# Patient Record
Sex: FEMALE | Race: BLACK OR AFRICAN AMERICAN | NOT HISPANIC OR LATINO | Employment: FULL TIME | URBAN - METROPOLITAN AREA
[De-identification: names, ages, dates, MRNs, and addresses within clinical notes are randomized per-mention and may not be internally consistent; named-entity substitution may affect disease eponyms.]

---

## 2017-01-11 ENCOUNTER — HOSPITAL ENCOUNTER (EMERGENCY)
Facility: HOSPITAL | Age: 37
Discharge: HOME | End: 2017-01-12
Attending: EMERGENCY MEDICINE
Payer: MEDICAID

## 2017-01-11 VITALS
HEART RATE: 85 BPM | TEMPERATURE: 99 F | RESPIRATION RATE: 18 BRPM | OXYGEN SATURATION: 100 % | BODY MASS INDEX: 37.35 KG/M2 | WEIGHT: 238 LBS | SYSTOLIC BLOOD PRESSURE: 161 MMHG | DIASTOLIC BLOOD PRESSURE: 95 MMHG | HEIGHT: 67 IN

## 2017-01-11 DIAGNOSIS — M54.16 LUMBAR RADICULOPATHY, ACUTE: Primary | ICD-10-CM

## 2017-01-11 PROCEDURE — 99283 EMERGENCY DEPT VISIT LOW MDM: CPT | Mod: 25

## 2017-01-11 PROCEDURE — 96372 THER/PROPH/DIAG INJ SC/IM: CPT

## 2017-01-11 PROCEDURE — 99284 EMERGENCY DEPT VISIT MOD MDM: CPT | Mod: ,,, | Performed by: EMERGENCY MEDICINE

## 2017-01-11 RX ORDER — KETOROLAC TROMETHAMINE 30 MG/ML
10 INJECTION, SOLUTION INTRAMUSCULAR; INTRAVENOUS
Status: COMPLETED | OUTPATIENT
Start: 2017-01-12 | End: 2017-01-12

## 2017-01-11 NOTE — ED AVS SNAPSHOT
[facility]  [address] [facility] [address]               Nila Rose   2017 10:56 PM   ED    Description:  Female : 1980   Department:  [facility]           Your Care was Coordinated By:     Provider Role From To    Kylee Batres MD Attending Provider [phone] --    Eden Hickey PA-C Physician Assistant 17 [phone] --      Reason for Visit     Back Pain           Diagnoses this Visit        Comments    Lumbar radiculopathy, acute    -  Primary       ED Disposition     ED Disposition Condition Comment    Discharge             To Do List           Follow-up Information     Schedule an appointment as soon as possible for a visit with [facility].    Specialty:  Orthopedics    Why:  For reevaluation    Contact information:    [address] [facility] [id number] [phone]    Additional information:    [address] - [address]th Floor       These Medications        Disp Refills Start End    diclofenac (VOLTAREN) 75 MG EC tablet 14 tablet 0 2017     Take 1 tablet (75 mg total) by mouth 2 (two) times daily. - Oral    methocarbamol (ROBAXIN) 500 MG Tab 30 tablet 0 2017    Take 2 tablets (1,000 mg total) by mouth 3 (three) times daily. - Oral      [facility]     [facility] On Call Nurse Care Line -  Assistance  Registered nurses in the [facility] provide clinical advisement, health education, appointment booking, and other advisory services.  Call for this free service at [phone].             Medications           Message regarding Medications     Verify the changes and/or additions to your medication regime listed below are the same as discussed with your clinician today.  If any of these changes or additions are incorrect, please notify your healthcare provider.        START taking these NEW medications        Refills    diclofenac (VOLTAREN) 75 MG EC  "tablet 0    Sig: Take 1 tablet (75 mg total) by mouth 2 (two) times daily.    Class: Print    Route: Oral    methocarbamol (ROBAXIN) 500 MG Tab 0    Sig: Take 2 tablets (1,000 mg total) by mouth 3 (three) times daily.    Class: Print    Route: Oral      These medications were administered today        Dose Freq    ketorolac injection 10 mg 10 mg ED 1 Time    Starting on: 1/12/2017    Sig: Inject 10 mg into the muscle ED 1 Time.    Class: Normal    Route: Intramuscular    Cosign for Ordering: Required by Kylee Batres MD           Verify that the below list of medications is an accurate representation of the medications you are currently taking.  If none reported, the list may be blank. If incorrect, please contact your healthcare provider. Carry this list with you in case of emergency.           Current Medications     butalbital-acetaminophen-caffeine -40 mg (FIORICET, ESGIC) -40 mg per tablet Take 1 tablet by mouth every 4 (four) hours as needed for Pain.    diclofenac (VOLTAREN) 75 MG EC tablet Take 1 tablet (75 mg total) by mouth 2 (two) times daily.    ketorolac injection 10 mg Inject 10 mg into the muscle ED 1 Time.    metformin (GLUMETZA) 500 MG (MOD) 24 hr tablet Take 500 mg by mouth daily with breakfast.    methocarbamol (ROBAXIN) 500 MG Tab Take 2 tablets (1,000 mg total) by mouth 3 (three) times daily.    UNKNOWN TO PATIENT Does not know name.           Clinical Reference Information           Your Vitals Were     BP Pulse Temp Resp Height Weight    161/95 85 98.7 °F (37.1 °C) (Oral) 18 5' 7" (1.702 m) 108 kg (238 lb)    Last Period SpO2 BMI          01/04/2017 100% 37.28 kg/m2        Allergies as of 1/12/2017     No Known Allergies      Immunizations Administered on Date of Encounter - 1/12/2017     None      ED Micro, Lab, POCT     Start Ordered       Status Ordering Provider    01/12/17 0000 01/12/17 0000  ISTAT PROCEDURE  Once      Final result     01/11/17 2349 01/11/17 2349  ISTAT " CHEM8  Once      Acknowledged       ED Imaging Orders     None      Discharge References/Attachments     BACK PAIN (LOW) OR LEG PAIN: POSSIBLE CAUSES (ENGLISH)    SCIATICA (ENGLISH)       [facility] complies with applicable Federal civil rights laws and does not discriminate on the basis of race, color, national origin, age, disability, or sex.        Language Assistance Services     ATTENTION: Language assistance services are available, free of charge. Please call [phone].      ATENCIÓN: Si habla dirk, tiene a ojeda disposición servicios gratuitos de asistencia lingüística. Llame al [phone].     CHÚ Ý: N?u b?n nói Ti?ng Vi?t, có các d?ch v? h? tr? ngôn ng? mi?n phí dành cho b?n. G?i s? [phone].

## 2017-01-12 LAB
BUN SERPL-MCNC: 9 MG/DL (ref 6–30)
CHLORIDE SERPL-SCNC: 102 MMOL/L (ref 95–110)
CREAT SERPL-MCNC: 0.6 MG/DL (ref 0.5–1.4)
GLUCOSE SERPL-MCNC: 118 MG/DL (ref 70–110)
HCT VFR BLD CALC: 36 %PCV (ref 36–54)
POC IONIZED CALCIUM: 1.13 MMOL/L (ref 1.06–1.42)
POC TCO2 (MEASURED): 26 MMOL/L (ref 23–29)
POTASSIUM BLD-SCNC: 3.7 MMOL/L (ref 3.5–5.1)
SAMPLE: ABNORMAL
SODIUM BLD-SCNC: 140 MMOL/L (ref 136–145)

## 2017-01-12 PROCEDURE — 63600175 PHARM REV CODE 636 W HCPCS: Performed by: PHYSICIAN ASSISTANT

## 2017-01-12 RX ORDER — DICLOFENAC SODIUM 75 MG/1
75 TABLET, DELAYED RELEASE ORAL 2 TIMES DAILY
Qty: 14 TABLET | Refills: 0 | Status: SHIPPED | OUTPATIENT
Start: 2017-01-12 | End: 2017-02-28

## 2017-01-12 RX ORDER — METHOCARBAMOL 500 MG/1
1000 TABLET, FILM COATED ORAL 3 TIMES DAILY
Qty: 30 TABLET | Refills: 0 | Status: SHIPPED | OUTPATIENT
Start: 2017-01-12 | End: 2017-01-17

## 2017-01-12 RX ADMIN — KETOROLAC TROMETHAMINE 10 MG: 30 INJECTION, SOLUTION INTRAMUSCULAR at 12:01

## 2017-01-12 NOTE — ED PROVIDER NOTES
Encounter Date: 1/11/2017       History     Chief Complaint   Patient presents with    Back Pain     Lower back pain which goes down the left leg.      Review of patient's allergies indicates:  No Known Allergies  HPI Comments: 36-year-old female with a PMH of migraine headache, DM presents to the ED with a chief complaint of back pain.  Patient reports low left-sided back pain over the past month that worsened yesterday.  Pain radiates down the left leg and is worse with standing, moving, and sitting for long periods of time.  She reports some associated tingling to the left foot. Patient reports working out the day before his symptoms began.  Patient had a previous prescription of meloxicam which she has taken for her pain without much relief.  Patient denies fever, chills, extremity weakness, saddle anesthesia, bowel/bladder dysfunction.     The history is provided by the patient.     Past Medical History   Diagnosis Date    Diabetes mellitus     Hypertension     Migraine headache      No past medical history pertinent negatives.  Past Surgical History   Procedure Laterality Date    Tubal ligation       Family History   Problem Relation Age of Onset    Hypertension Mother     Hypertension Father     Diabetes Father      Social History   Substance Use Topics    Smoking status: Never Smoker    Smokeless tobacco: None    Alcohol use No     Review of Systems   Constitutional: Negative for chills and fever.   HENT: Negative for sore throat.    Respiratory: Negative for shortness of breath.    Cardiovascular: Negative for chest pain.   Gastrointestinal: Negative for nausea and vomiting.        (+) Bowel dysfunction   Genitourinary:        (-) bladder dysfunction, saddle anesthesia   Musculoskeletal: Positive for back pain.   Neurological: Positive for numbness. Negative for syncope and weakness.       Physical Exam   Initial Vitals   BP Pulse Resp Temp SpO2   01/11/17 2013 01/11/17 2013 01/11/17 2013  01/11/17 2013 01/11/17 2013   161/95 85 18 98.7 °F (37.1 °C) 100 %     Physical Exam    Constitutional: She appears well-developed and well-nourished. She is not diaphoretic.  Non-toxic appearance. She does not appear ill. No distress.   HENT:   Head: Normocephalic and atraumatic.   Right Ear: External ear normal.   Left Ear: External ear normal.   Eyes: EOM are normal.   Neck: Normal range of motion and phonation normal. Neck supple.   Cardiovascular: Normal rate and regular rhythm. Exam reveals no gallop and no friction rub.    No murmur heard.  Pulmonary/Chest: Effort normal and breath sounds normal. No accessory muscle usage. No tachypnea. No respiratory distress. She has no decreased breath sounds. She has no wheezes. She has no rhonchi. She has no rales.   Abdominal: Soft. Normal appearance. She exhibits no distension.   Musculoskeletal:   Left-sided lumbosacral tenderness.  Tenderness to the left buttock and along the distribution of the sciatic nerve.  Positive straight leg raise on the left.  No rashes or skin lesions noted.   Neurological: She is alert and oriented to person, place, and time.   Skin: Skin is warm and dry. No rash noted. No pallor.   Psychiatric: She has a normal mood and affect. Her behavior is normal. Judgment and thought content normal.         ED Course   Procedures  Labs Reviewed   ISTAT PROCEDURE - Abnormal; Notable for the following:        Result Value    POC Glucose 118 (*)     All other components within normal limits   ISTAT CHEM8             Medical Decision Making:   History:   Old Medical Records: I decided to obtain old medical records.  Differential Diagnosis:   Muscle strain, lumbar radiculopathy, herniated disc, cauda equina syndrome       APC / Resident Notes:   MDM:  36-year-old female presents for evaluation of left-sided low back pain that radiates down the left lower extremity.  Pain has been present for the past month, but worsened one to 2 days ago.  She is  hypertensive at 161/95.  Vitals otherwise within normal limits.  Physical exam reveals Left-sided lumbosacral tenderness.  Tenderness to the left buttock and along the distribution of the sciatic nerve.  Positive straight leg raise on the left.  No rashes or skin lesions noted.    Patient patient's history and exam, her pain is likely secondary to lumbar radiculopathy.  I will treat with Toradol in the ED and discharge with NSAIDs.  Patient will follow up with the spine center next week if symptoms do not improve.  ED warnings and return precautions given.  I have reviewed the patient's records and discussed this case with my supervising physician.         Attending Attestation:     Physician Attestation Statement for NP/PA:   I discussed this assessment and plan of this patient with the NP/PA, but I did not personally examine the patient. The face to face encounter was performed by the NP/PA.          Attending ED Notes:   56-year-old with atraumatic back pain.  Symptoms consistent with sciatica.  No neurologic deficit.          ED Course     Clinical Impression:   The encounter diagnosis was Lumbar radiculopathy, acute.    Disposition:   Disposition: Discharged  Condition: Stable       DOROTHY MurrayC  01/12/17 0054       Kylee Batres MD  01/12/17 0452

## 2017-01-12 NOTE — ED NOTES
Pt assisted to bed. No acute distress is noted. Pt identifiers verified  Appearance: Pt is clean and well groomed. Clothes appropriately fastened  Neuro: pt awake and alert. Pt oriented x4. Speech is clear and appropriate. Facial movement is symmetrical. Pt moving all extremities. Sensation in all extremities is intact, numbness to left lower foot, shooting pain down left lower extremity, pain to left lower back.   Resp: airway is patent with normal rate and effort noted. Pt on room air.   Skin: skin is warm, dry and intact  Cardiac: Heart sounds are normal. Peripheral pulses are palpable and intact. Cap refill <3 seconds. No edema noted  GI: abdomen is soft and non-tender. Pt denies abdominal pain. Pt states bowel movements have been regular  : pt denies frequency or pain when urinating

## 2017-02-28 ENCOUNTER — HOSPITAL ENCOUNTER (EMERGENCY)
Facility: OTHER | Age: 37
Discharge: HOME | End: 2017-02-28
Attending: EMERGENCY MEDICINE
Payer: MEDICAID

## 2017-02-28 VITALS
DIASTOLIC BLOOD PRESSURE: 95 MMHG | TEMPERATURE: 99 F | RESPIRATION RATE: 16 BRPM | OXYGEN SATURATION: 98 % | WEIGHT: 234 LBS | SYSTOLIC BLOOD PRESSURE: 139 MMHG | BODY MASS INDEX: 36.73 KG/M2 | HEART RATE: 91 BPM | HEIGHT: 67 IN

## 2017-02-28 DIAGNOSIS — M54.42 LEFT-SIDED LOW BACK PAIN WITH LEFT-SIDED SCIATICA, UNSPECIFIED CHRONICITY: Primary | ICD-10-CM

## 2017-02-28 LAB
B-HCG UR QL: NEGATIVE
CTP QC/QA: YES

## 2017-02-28 PROCEDURE — 63600175 PHARM REV CODE 636 W HCPCS: Performed by: PHYSICIAN ASSISTANT

## 2017-02-28 PROCEDURE — 96372 THER/PROPH/DIAG INJ SC/IM: CPT

## 2017-02-28 PROCEDURE — 25000003 PHARM REV CODE 250: Performed by: PHYSICIAN ASSISTANT

## 2017-02-28 PROCEDURE — 99283 EMERGENCY DEPT VISIT LOW MDM: CPT | Mod: 25

## 2017-02-28 PROCEDURE — 81025 URINE PREGNANCY TEST: CPT | Performed by: EMERGENCY MEDICINE

## 2017-02-28 RX ORDER — IBUPROFEN 800 MG/1
800 TABLET ORAL EVERY 6 HOURS PRN
Qty: 20 TABLET | Refills: 0 | Status: SHIPPED | OUTPATIENT
Start: 2017-02-28 | End: 2017-11-29

## 2017-02-28 RX ORDER — METHOCARBAMOL 500 MG/1
500 TABLET, FILM COATED ORAL
Status: COMPLETED | OUTPATIENT
Start: 2017-02-28 | End: 2017-02-28

## 2017-02-28 RX ORDER — METHOCARBAMOL 500 MG/1
1000 TABLET, FILM COATED ORAL 3 TIMES DAILY
Qty: 30 TABLET | Refills: 0 | Status: SHIPPED | OUTPATIENT
Start: 2017-02-28 | End: 2017-03-05

## 2017-02-28 RX ORDER — KETOROLAC TROMETHAMINE 30 MG/ML
30 INJECTION, SOLUTION INTRAMUSCULAR; INTRAVENOUS
Status: COMPLETED | OUTPATIENT
Start: 2017-02-28 | End: 2017-02-28

## 2017-02-28 RX ADMIN — METHOCARBAMOL 500 MG: 500 TABLET ORAL at 06:02

## 2017-02-28 RX ADMIN — KETOROLAC TROMETHAMINE 30 MG: 30 INJECTION, SOLUTION INTRAMUSCULAR at 06:02

## 2017-02-28 NOTE — ED NOTES
Two patient identifiers correct for Nila Rose.   LOC: The patient is awake, alert and aware of environment with an appropriate affect. The patient is oriented x 3 and speaking appropriate.  APPEARANCE: Patient resting comfortably and in no acute distress.  Patient is clean and well groomed.  The patient's clothing is properly fastened.  SKIN: The skin is warm and dry.  The patient has normal skin turgor and moist mucus membranes.  No rashes or lesions noted.  Skin Intact. No breakdown noted.  MUSCULOSKELETAL:  With left lower back pain radiating to the left hip and down the left leg to the foot.  Patient moving all extremities well, no obvious swelling or deformities noted.  RESPIRATORY: Airway is open and patent.  Respirations are spontaneous.  Patient has a normal effort and rate.  Trachea midline.  No accessory muscle use.  CARDIAC: Patient has a normal rate and rhythm.  No peripheral edema noted.  Capillary refill is less than 3 seconds.    ABDOMEN: Soft and non tender to palpation.  No distention noted.   PULSES:   Radial pulses, 2+ bilaterally.    NEUROLOGIC: PERRL . Facial expression is symmetrical.  Moving all extremities equally.  Normal sensation in all extremities when touched with a finger.  Awake, alert and cooperative with a calm affect.  Aware of environment.

## 2017-02-28 NOTE — ED NOTES
Ambulated to ED room 13.  Presents to the ED with complaint of left lower back pain radiating to the left hip and down the left leg to the left foot.  Reports tingling to the left calf and left foot.  History of sciatica.  History of Diabetes.  Denies any falls trauma or injury.

## 2017-02-28 NOTE — ED AVS SNAPSHOT
[facility]  [address]  [facility] [address]               Nila Rose   2017  5:51 PM   ED    Description:  Female : 1980   Department:  [facility]           Your Care was Coordinated By:     Provider Role From To    Catrina Brown MD Attending Provider 17 [phone] --    Arlyn Beckwith PA-C Physician Assistant 17 [phone] --      Reason for Visit     Leg Pain           Diagnoses this Visit        Comments    Left-sided low back pain with left-sided sciatica, unspecified chronicity    -  Primary       ED Disposition     None           To Do List           Follow-up Information     Follow up with [facility] In 2 days.    Specialties:  Nursing Home Agency, SNF Agency    Contact information:    [address] [facility]  [facility] [id number] [phone]          Follow up with [facility].    Specialty:  Emergency Medicine    Why:  If symptoms worsen    Contact information:    [address]  [facility] [address] [phone]        Follow up with [facility] [place].    Specialty:  Spine Services    Contact information:    [address]  [address]  [facility] [address] [phone]    Additional information:    [facility], 4th [address], [address]       These Medications        Disp Refills Start End    methocarbamol (ROBAXIN) 500 MG Tab 30 tablet 0 2017 3/5/2017    Take 2 tablets (1,000 mg total) by mouth 3 (three) times daily. - Oral    ibuprofen (ADVIL,MOTRIN) 800 MG tablet 20 tablet 0 2017     Take 1 tablet (800 mg total) by mouth every 6 (six) hours as needed for Pain. - Oral      [facility]     [facility] Care [facility] -  Assistance  Registered nurses in the [facility] provide clinical advisement, health education, appointment booking, and other advisory services.  Call for this free service at  [phone].             Medications           Message regarding Medications     Verify the changes and/or additions to your medication regime listed below are the same as discussed with your clinician today.  If any of these changes or additions are incorrect, please notify your healthcare provider.        START taking these NEW medications        Refills    methocarbamol (ROBAXIN) 500 MG Tab 0    Sig: Take 2 tablets (1,000 mg total) by mouth 3 (three) times daily.    Class: Print    Route: Oral    ibuprofen (ADVIL,MOTRIN) 800 MG tablet 0    Sig: Take 1 tablet (800 mg total) by mouth every 6 (six) hours as needed for Pain.    Class: Print    Route: Oral      These medications were administered today        Dose Freq    ketorolac injection 30 mg 30 mg ED 1 Time    Sig: Inject 30 mg into the muscle ED 1 Time.    Class: Normal    Route: Intramuscular    methocarbamol tablet 500 mg 500 mg ED 1 Time    Sig: Take 1 tablet (500 mg total) by mouth ED 1 Time.    Class: Normal    Route: Oral      STOP taking these medications     diclofenac (VOLTAREN) 75 MG EC tablet Take 1 tablet (75 mg total) by mouth 2 (two) times daily.    UNKNOWN TO PATIENT Does not know name.    butalbital-acetaminophen-caffeine -40 mg (FIORICET, ESGIC) -40 mg per tablet Take 1 tablet by mouth every 4 (four) hours as needed for Pain.           Verify that the below list of medications is an accurate representation of the medications you are currently taking.  If none reported, the list may be blank. If incorrect, please contact your healthcare provider. Carry this list with you in case of emergency.           Current Medications     ibuprofen (ADVIL,MOTRIN) 800 MG tablet Take 1 tablet (800 mg total) by mouth every 6 (six) hours as needed for Pain.    metformin (GLUMETZA) 500 MG (MOD) 24 hr tablet Take 500 mg by mouth daily with breakfast.    methocarbamol (ROBAXIN) 500 MG Tab Take 2 tablets (1,000 mg total) by mouth 3 (three) times  daily.           Clinical Reference Information           Your Vitals Were     BP                   139/95 (BP Location: Right arm, Patient Position: Sitting, BP Method: Automatic)           Allergies as of 2/28/2017     No Known Allergies      Immunizations Administered on Date of Encounter - 2/28/2017     None      ED Micro, Lab, POCT     Start Ordered       Status Ordering Provider    02/28/17 1723 02/28/17 1722  POCT urine pregnancy  Once      Final result       ED Imaging Orders     None      Discharge References/Attachments     BACK PAIN (ACUTE OR CHRONIC) (ENGLISH)    BACK PAIN (LOW): SELF-CARE (ENGLISH)    SCIATICA (ENGLISH)       [facility] complies with applicable Federal civil rights laws and does not discriminate on the basis of race, color, national origin, age, disability, or sex.        Language Assistance Services     ATTENTION: Language assistance services are available, free of charge. Please call [phone].      ATENCIÓN: Si habla dirk, tiene a ojeda disposición servicios gratuitos de asistencia lingüística. Llame al [phone].     CHÚ Ý: N?u b?n nói Ti?ng Vi?t, có các d?ch v? h? tr? ngôn ng? mi?n phí dành cho b?n. G?i s? [phone].

## 2017-03-01 NOTE — ED PROVIDER NOTES
Encounter Date: 2/28/2017       History     Chief Complaint   Patient presents with    Leg Pain     + left leg pain, + left sided lower back pain radiating to left leg, hx of sciatica.     Review of patient's allergies indicates:  No Known Allergies  HPI Comments: Patient is a 37 y.o. female presenting to the emergency department with complaints of left low back pain that radiates into her left leg.  The patient reports her current episode of symptoms started yesterday evening.  She does report some daily pain, but states that she has had a recent exacerbation.  She denies recent injury or trauma.  She denies numbness, tingling, weakness of her lower extremities bilaterally.  She denies loss of urinary bowel control.  She does report that she has had similar symptoms in the past, states she is typically treated with NSAIDs.  She states that she is in the process of establishing a primary care provider to follow up with.  She denies dysuria or hematuria.    The history is provided by the patient.     Past Medical History:   Diagnosis Date    Diabetes mellitus     Hypertension     Migraine headache      Past Surgical History:   Procedure Laterality Date    TUBAL LIGATION       Family History   Problem Relation Age of Onset    Hypertension Mother     Hypertension Father     Diabetes Father      Social History   Substance Use Topics    Smoking status: Never Smoker    Smokeless tobacco: None    Alcohol use No     Review of Systems   Constitutional: Negative for activity change, appetite change, chills, fatigue and fever.   HENT: Negative for congestion, rhinorrhea, sinus pressure, sneezing, sore throat and trouble swallowing.    Eyes: Negative for photophobia and visual disturbance.   Respiratory: Negative for cough, chest tightness, shortness of breath and wheezing.    Cardiovascular: Negative for chest pain and palpitations.   Gastrointestinal: Negative for abdominal pain, constipation, diarrhea, nausea and  vomiting.   Genitourinary: Negative for dysuria, hematuria and urgency.   Musculoskeletal: Positive for back pain and myalgias. Negative for neck pain and neck stiffness.   Skin: Negative for color change and wound.   Neurological: Negative for dizziness, weakness, light-headedness, numbness and headaches.   Psychiatric/Behavioral: Negative for agitation and confusion.       Physical Exam   Initial Vitals   BP Pulse Resp Temp SpO2   02/28/17 1721 02/28/17 1721 02/28/17 1721 02/28/17 1721 02/28/17 1721   169/110 103 16 98 °F (36.7 °C) 97 %     Physical Exam    Nursing note and vitals reviewed.  Constitutional: She appears well-developed and well-nourished. She is not diaphoretic. She is cooperative.  Non-toxic appearance. She does not have a sickly appearance. She does not appear ill. No distress.   Well appearing, -American female unaccompanied in the emergency department.  She is speaking in clear and full sentences, moving all extremities, ambulates without difficulty.  She is in no acute distress.   HENT:   Head: Normocephalic and atraumatic.   Right Ear: External ear normal.   Left Ear: External ear normal.   Nose: Nose normal.   Mouth/Throat: Oropharynx is clear and moist.   Eyes: Conjunctivae and EOM are normal.   Neck: Normal range of motion. Neck supple.   Cardiovascular: Normal rate, regular rhythm and normal heart sounds.   Pulmonary/Chest: Breath sounds normal. No respiratory distress. She has no wheezes. She has no rhonchi. She has no rales.   Abdominal: Soft. Bowel sounds are normal. She exhibits no distension. There is no tenderness. There is no rebound and no guarding.   Musculoskeletal: Normal range of motion.   Tenderness to palpation the left sided paraspinal muscles of the lumbar spine at the SI joint with no midline tenderness, palpable deformity, crepitus or step-off.  Ambulatory without difficulty.  Positive left sided straight leg test.   Neurological: She is alert and oriented to  person, place, and time. She has normal strength. No cranial nerve deficit or sensory deficit. GCS eye subscore is 4. GCS verbal subscore is 5. GCS motor subscore is 6.   Skin: Skin is warm.   Psychiatric: She has a normal mood and affect. Her behavior is normal. Judgment and thought content normal.         ED Course   Procedures  Labs Reviewed   POCT URINE PREGNANCY             Medical Decision Making:   Clinical Tests:   Lab Tests: Ordered and Reviewed  The following lab test(s) were unremarkable: UPT  Other:   I have discussed this case with another health care provider.       <> Summary of the Discussion: Dr. Brown  This note was created using Dragon Medical Dictation. There may be typographical errors secondary to dictation.     Urgent evaluation of a 37 y.o. female presenting to the emergency department complaining of left low back pain with radiation to the left leg. Patient is afebrile, nontoxic appearing, hemodynamically stable and neurovascularly intact. Physical exam reveals regular rate and rhythm, lungs are clear to auscultation bilaterally.  Tenderness to palpation the left sided paraspinal muscles of the lumbar spine and SI joint with no midline tenderness, crepitus, step-off or deformity.  Positive left sided straight leg test. There are no signs of saddle anesthesia, incontinence, neurologic deficits, fevers, trauma or midline tenderness on history or physical to suggest cauda equina, infectious process, fracture or subluxation. I do believe the patient's back pain musculoskeletal in nature.  The patient will be administered Toradol and Robaxin the emergency department, discharged home with a prescription for ibuprofen and Robaxin.  She was counseled extensively on symptomatic care and treatment. The patient was instructed to follow up with a primary care provider in 2 days or to return to the emergency department for worsening symptoms. The treatment plan was discussed with the patient who  demonstrated understanding and comfort with plan. The patient's history, physical exam, and plan of care was discussed with and agreed upon with my supervising physician.     Past Medical History:   Diagnosis Date    Diabetes mellitus     Hypertension     Migraine headache                      ED Course     Clinical Impression:     1. Left-sided low back pain with left-sided sciatica, unspecified chronicity       Disposition:   Disposition: Discharged  Condition: Stable       Arlyn Beckwith PA-C  02/28/17 1923

## 2017-09-07 ENCOUNTER — HOSPITAL ENCOUNTER (EMERGENCY)
Facility: OTHER | Age: 37
Discharge: HOME | End: 2017-09-07
Attending: EMERGENCY MEDICINE
Payer: MEDICAID

## 2017-09-07 VITALS
WEIGHT: 213 LBS | HEART RATE: 85 BPM | DIASTOLIC BLOOD PRESSURE: 87 MMHG | SYSTOLIC BLOOD PRESSURE: 143 MMHG | BODY MASS INDEX: 31.55 KG/M2 | OXYGEN SATURATION: 100 % | HEIGHT: 69 IN | RESPIRATION RATE: 16 BRPM | TEMPERATURE: 98 F

## 2017-09-07 DIAGNOSIS — R10.9 ABDOMINAL CRAMPING: Primary | ICD-10-CM

## 2017-09-07 LAB
B-HCG UR QL: NEGATIVE
BILIRUB UR QL STRIP: NEGATIVE
CLARITY UR: CLEAR
COLOR UR: YELLOW
CTP QC/QA: YES
GLUCOSE UR QL STRIP: NEGATIVE
HGB UR QL STRIP: NEGATIVE
KETONES UR QL STRIP: NEGATIVE
LEUKOCYTE ESTERASE UR QL STRIP: NEGATIVE
NITRITE UR QL STRIP: NEGATIVE
PH UR STRIP: 7 [PH] (ref 5–8)
PROT UR QL STRIP: NEGATIVE
SP GR UR STRIP: 1.01 (ref 1–1.03)
URN SPEC COLLECT METH UR: NORMAL
UROBILINOGEN UR STRIP-ACNC: NEGATIVE EU/DL

## 2017-09-07 PROCEDURE — 99283 EMERGENCY DEPT VISIT LOW MDM: CPT | Mod: 25

## 2017-09-07 PROCEDURE — 81025 URINE PREGNANCY TEST: CPT | Performed by: PHYSICIAN ASSISTANT

## 2017-09-07 PROCEDURE — 81003 URINALYSIS AUTO W/O SCOPE: CPT

## 2017-09-07 RX ORDER — PHENTERMINE HYDROCHLORIDE 37.5 MG/1
37.5 TABLET ORAL
COMMUNITY
End: 2017-11-29

## 2017-09-07 RX ORDER — TOPIRAMATE 25 MG/1
25 TABLET ORAL 2 TIMES DAILY
COMMUNITY

## 2017-09-07 NOTE — ED PROVIDER NOTES
"Encounter Date: 9/7/2017       History     Chief Complaint   Patient presents with    Abdominal Pain     + right sided low abdominal pains x several days " I thought I was pregnant but my tubes are tied. I had my period last Monday but it only lasted three days" + intermittent nausea w/out vomiting.      Pt is a 37-year-old female with history of diabetes and hypertension who presents to the emergency department with intermittent pelvic cramping.  Patient states over the last week she has had intermittent sharp pains in the right side of her pelvic region.  She states that her period has been abnormal the last 2 months.  She states that she is not currently in any pain.  She denies urinary symptoms.  She denies active vaginal bleeding currently.  She denies any abnormal vaginal discharge.  She denies nausea or vomiting.  She denies diarrhea.  She reports a normal bowel movement yesterday with no blood per rectum.  She denies fevers or chills.  She states that with her periods being abnormal, her friend told her she may be pregnant.  She states her tubes are tied, so she wants to make sure that this is not an ectopic pregnancy.      The history is provided by the patient.     Review of patient's allergies indicates:  No Known Allergies  Past Medical History:   Diagnosis Date    Diabetes mellitus     Hypertension     Migraine headache      Past Surgical History:   Procedure Laterality Date    TUBAL LIGATION       Family History   Problem Relation Age of Onset    Hypertension Mother     Hypertension Father     Diabetes Father      Social History   Substance Use Topics    Smoking status: Never Smoker    Smokeless tobacco: Never Used    Alcohol use No     Review of Systems   Constitutional: Negative for activity change, appetite change, chills, fatigue and fever.   HENT: Negative for congestion, ear pain, sore throat and trouble swallowing.    Respiratory: Negative for cough and shortness of breath.  "   Cardiovascular: Negative for chest pain.   Gastrointestinal: Negative for abdominal pain, blood in stool, constipation, diarrhea, nausea and vomiting.   Genitourinary: Positive for pelvic pain. Negative for dysuria, flank pain, vaginal bleeding, vaginal discharge and vaginal pain.   Musculoskeletal: Negative for back pain, neck pain and neck stiffness.   Skin: Negative for rash and wound.   Neurological: Negative for dizziness, syncope, speech difficulty, weakness, light-headedness, numbness and headaches.       Physical Exam     Initial Vitals [09/07/17 1338]   BP Pulse Resp Temp SpO2   (!) 185/111 90 16 98.8 °F (37.1 °C) 100 %      MAP       135.67         Physical Exam    Nursing note and vitals reviewed.  Constitutional: She appears well-developed and well-nourished. She is not diaphoretic.  Non-toxic appearance. No distress.   HENT:   Head: Normocephalic.   Right Ear: Hearing and external ear normal.   Left Ear: Hearing and external ear normal.   Nose: Nose normal.   Mouth/Throat: Uvula is midline, oropharynx is clear and moist and mucous membranes are normal. No trismus in the jaw. No uvula swelling. No oropharyngeal exudate.   Eyes: Conjunctivae are normal. Pupils are equal, round, and reactive to light.   Neck: Normal range of motion.   Cardiovascular: Normal rate and regular rhythm.   Pulmonary/Chest: Breath sounds normal. No respiratory distress. She has no wheezes. She has no rhonchi. She has no rales. She exhibits no tenderness.   Abdominal: Soft. Bowel sounds are normal. She exhibits no distension and no mass. There is no tenderness. There is no rebound and no guarding.   Lymphadenopathy:     She has no cervical adenopathy.   Neurological: She is alert and oriented to person, place, and time.   Skin: Skin is warm and dry. Capillary refill takes less than 2 seconds.   Psychiatric: She has a normal mood and affect.         ED Course   Procedures  Labs Reviewed   URINALYSIS   POCT URINE PREGNANCY              Medical Decision Making:   Initial Assessment:   Urgent evaluation of 37-year-old female with history of hypertension and diabetes who presents to the emergency department with intermittent pelvic cramping.  Patient is afebrile, nontoxic appearing, and hemodynamically stable.  Benign abdominal exam.  I do not suspect acute abdomen.  I do not suspect TOA, ovarian torsion, or PID.  UPT and urinalysis will be obtained.  ED Management:  UPT is negative.  Urinalysis reveals no signs of infection.  Patient is currently still asymptomatic.  I do not feel that further workup is warranted here.  She is advised to follow-up with PCP or return to the emergency department with any worsening symptoms or concerns.  Other:   I have discussed this case with another health care provider.       <> Summary of the Discussion: Dr. Encinas                   ED Course      Clinical Impression:   The encounter diagnosis was Abdominal cramping.                           Yarely Cunningham PA-C  09/07/17 2026

## 2017-09-07 NOTE — ED NOTES
Pt reports RLQ abd pain radiating to low back since Monday. Intermittent, sharp pain. Subjective fever yesterday. Nausea. Denies v/d. NAD. Denies urinary problems. Will cont to monitor.

## 2018-04-21 ENCOUNTER — HOSPITAL ENCOUNTER (EMERGENCY)
Facility: HOSPITAL | Age: 38
Discharge: HOME | End: 2018-04-21
Attending: EMERGENCY MEDICINE
Payer: MEDICAID

## 2018-04-21 VITALS
HEIGHT: 67 IN | OXYGEN SATURATION: 100 % | TEMPERATURE: 98 F | DIASTOLIC BLOOD PRESSURE: 87 MMHG | RESPIRATION RATE: 18 BRPM | HEART RATE: 83 BPM | BODY MASS INDEX: 33.74 KG/M2 | WEIGHT: 215 LBS | SYSTOLIC BLOOD PRESSURE: 165 MMHG

## 2018-04-21 DIAGNOSIS — R51.9 NONINTRACTABLE HEADACHE, UNSPECIFIED CHRONICITY PATTERN, UNSPECIFIED HEADACHE TYPE: Primary | ICD-10-CM

## 2018-04-21 LAB
B-HCG UR QL: NEGATIVE
BILIRUB UR QL STRIP: NEGATIVE
CLARITY UR: CLEAR
COLOR UR: NORMAL
CTP QC/QA: YES
GLUCOSE UR QL STRIP: NEGATIVE
HGB UR QL STRIP: NEGATIVE
KETONES UR QL STRIP: NEGATIVE
LEUKOCYTE ESTERASE UR QL STRIP: NEGATIVE
NITRITE UR QL STRIP: NEGATIVE
PH UR STRIP: 6 [PH] (ref 5–8)
POCT GLUCOSE: 148 MG/DL (ref 70–110)
PROT UR QL STRIP: NEGATIVE
SP GR UR STRIP: 1.01 (ref 1–1.03)
URN SPEC COLLECT METH UR: NORMAL
UROBILINOGEN UR STRIP-ACNC: NEGATIVE EU/DL

## 2018-04-21 PROCEDURE — 81003 URINALYSIS AUTO W/O SCOPE: CPT

## 2018-04-21 PROCEDURE — 81025 URINE PREGNANCY TEST: CPT | Performed by: PHYSICIAN ASSISTANT

## 2018-04-21 PROCEDURE — 96372 THER/PROPH/DIAG INJ SC/IM: CPT | Mod: 59

## 2018-04-21 PROCEDURE — 96374 THER/PROPH/DIAG INJ IV PUSH: CPT

## 2018-04-21 PROCEDURE — 63600175 PHARM REV CODE 636 W HCPCS: Performed by: PHYSICIAN ASSISTANT

## 2018-04-21 PROCEDURE — 82962 GLUCOSE BLOOD TEST: CPT

## 2018-04-21 PROCEDURE — 99284 EMERGENCY DEPT VISIT MOD MDM: CPT | Mod: 25

## 2018-04-21 PROCEDURE — 96375 TX/PRO/DX INJ NEW DRUG ADDON: CPT

## 2018-04-21 RX ORDER — ETODOLAC 200 MG/1
200 CAPSULE ORAL 3 TIMES DAILY PRN
Qty: 20 CAPSULE | Refills: 0 | Status: SHIPPED | OUTPATIENT
Start: 2018-04-21 | End: 2018-04-28

## 2018-04-21 RX ORDER — ORPHENADRINE CITRATE 30 MG/ML
30 INJECTION INTRAMUSCULAR; INTRAVENOUS
Status: COMPLETED | OUTPATIENT
Start: 2018-04-21 | End: 2018-04-21

## 2018-04-21 RX ORDER — DIPHENHYDRAMINE HYDROCHLORIDE 50 MG/ML
25 INJECTION INTRAMUSCULAR; INTRAVENOUS
Status: COMPLETED | OUTPATIENT
Start: 2018-04-21 | End: 2018-04-21

## 2018-04-21 RX ORDER — PROCHLORPERAZINE EDISYLATE 5 MG/ML
10 INJECTION INTRAMUSCULAR; INTRAVENOUS
Status: COMPLETED | OUTPATIENT
Start: 2018-04-21 | End: 2018-04-21

## 2018-04-21 RX ORDER — METHOCARBAMOL 500 MG/1
1000 TABLET, FILM COATED ORAL 3 TIMES DAILY PRN
Qty: 30 TABLET | Refills: 0 | Status: SHIPPED | OUTPATIENT
Start: 2018-04-21 | End: 2018-04-26

## 2018-04-21 RX ORDER — KETOROLAC TROMETHAMINE 30 MG/ML
15 INJECTION, SOLUTION INTRAMUSCULAR; INTRAVENOUS
Status: COMPLETED | OUTPATIENT
Start: 2018-04-21 | End: 2018-04-21

## 2018-04-21 RX ORDER — ONDANSETRON 4 MG/1
4 TABLET, ORALLY DISINTEGRATING ORAL EVERY 6 HOURS PRN
Qty: 15 TABLET | Refills: 0 | Status: SHIPPED | OUTPATIENT
Start: 2018-04-21 | End: 2018-04-26

## 2018-04-21 RX ADMIN — PROCHLORPERAZINE EDISYLATE 10 MG: 5 INJECTION INTRAMUSCULAR; INTRAVENOUS at 08:04

## 2018-04-21 RX ADMIN — ORPHENADRINE CITRATE 30 MG: 30 INJECTION INTRAMUSCULAR; INTRAVENOUS at 08:04

## 2018-04-21 RX ADMIN — DIPHENHYDRAMINE HYDROCHLORIDE 25 MG: 50 INJECTION, SOLUTION INTRAMUSCULAR; INTRAVENOUS at 08:04

## 2018-04-21 RX ADMIN — KETOROLAC TROMETHAMINE 15 MG: 30 INJECTION, SOLUTION INTRAMUSCULAR at 08:04

## 2018-04-21 NOTE — ED PROVIDER NOTES
Encounter Date: 4/21/2018    SCRIBE #1 NOTE: I, Thanh Stockton, am scribing for, and in the presence of,  Nila Anderson PA-C. I have scribed the following portions of the note - Other sections scribed: HPI.       History     Chief Complaint   Patient presents with    Headache     since Monday, hx of migraines, light and sound sensitivity    Dental Pain     on antibiotics     CC: Headache; Dental Pain;    HPI: This 38 y.o. Female with diabetes mellitus, HTN and migraine headache presents to the ED c/o headache, dizziness, nausea, frequency and neck pain x6 days. Pt reports her current headache is worse than her normal migraines. Her headache began R occipital but is now generalized. Pt states her headache normally begins around her cycle and already had her headache for April. Her dizziness feels like the room is spinning. She complies with her at home medications and excedrin and ibuprofen PRN. Pt reports her meds are making her sick. She denies fever, chills, emesis, chest pain or SOB. Pt last visited her PCP yesterday.    Pt is on antibiotics for dental pain which she began x3 days ago.      The history is provided by the patient. No  was used.     Review of patient's allergies indicates:  No Known Allergies  Past Medical History:   Diagnosis Date    Diabetes mellitus     Hypertension     Migraine headache      Past Surgical History:   Procedure Laterality Date    TUBAL LIGATION       Family History   Problem Relation Age of Onset    Hypertension Mother     Hypertension Father     Diabetes Father      Social History   Substance Use Topics    Smoking status: Never Smoker    Smokeless tobacco: Never Used    Alcohol use Yes      Comment: occ     Review of Systems   Constitutional: Negative for chills and fever.   HENT: Positive for ear pain. Negative for congestion, rhinorrhea, sore throat and trouble swallowing.         (+) R sided dental      Eyes: Negative for redness.    Respiratory: Negative for shortness of breath.    Cardiovascular: Negative for chest pain.   Gastrointestinal: Positive for nausea. Negative for abdominal pain and vomiting.   Genitourinary: Positive for frequency. Negative for dysuria, hematuria and urgency.   Musculoskeletal: Positive for neck pain. Negative for back pain and neck stiffness.   Skin: Negative for rash.   Neurological: Positive for dizziness and headaches. Negative for light-headedness.       Physical Exam     Initial Vitals [04/21/18 1745]   BP Pulse Resp Temp SpO2   (!) 168/102 95 20 98.8 °F (37.1 °C) 98 %      MAP       124         Physical Exam    Nursing note and vitals reviewed.  Constitutional: She appears well-developed and well-nourished.   HENT:   Head: Normocephalic.   Right Ear: Hearing, tympanic membrane, external ear and ear canal normal.   Left Ear: Hearing, tympanic membrane, external ear and ear canal normal.   Nose: Nose normal. No mucosal edema or rhinorrhea. Right sinus exhibits no maxillary sinus tenderness and no frontal sinus tenderness. Left sinus exhibits no maxillary sinus tenderness and no frontal sinus tenderness.   Mouth/Throat: Uvula is midline, oropharynx is clear and moist and mucous membranes are normal. No oropharyngeal exudate, posterior oropharyngeal edema or posterior oropharyngeal erythema.   No dental pain or TTP. No submandibular swelling or protursion of tongue. No trismus   Eyes: Conjunctivae are normal.   Neck: Trachea normal and full passive range of motion without pain. Neck supple. No spinous process tenderness and no muscular tenderness present. No neck rigidity. No Brudzinski's sign and no Kernig's sign noted.   Neck pain reproduced with movement of the neck. No meningeal signs.    Cardiovascular: Normal rate and regular rhythm. Exam reveals no gallop and no friction rub.    No murmur heard.  Pulmonary/Chest: Breath sounds normal. She has no wheezes. She has no rhonchi. She has no rales.   Abdominal:  Soft. Bowel sounds are normal. She exhibits no distension. There is no tenderness. There is no rebound, no guarding, no tenderness at McBurney's point and negative Lay's sign.   Musculoskeletal: Normal range of motion.   No midline or paraspinal TTP.   Lymphadenopathy:     She has no cervical adenopathy.   Neurological: She is alert. She has normal strength. No cranial nerve deficit or sensory deficit. Coordination and gait normal.   Skin: Skin is warm and dry.   Psychiatric: She has a normal mood and affect.         ED Course   Procedures  Labs Reviewed   POCT GLUCOSE - Abnormal; Notable for the following:        Result Value    POCT Glucose 148 (*)     All other components within normal limits   URINALYSIS   POCT URINE PREGNANCY             Medical Decision Making:   Initial Assessment:   Patient is a 38-year-old female with history of hypertension, migraine headaches who presents for evaluation of 6 day history of right-sided constant headache that became generalized.  Patient reports she has had one week history of right upper dental pain that resolved after being prescribed Amoxil 3 days ago; dental pain resolved yesterday. No dental TTP. No evidence of edison's angina or deep neck space infection. Pt reports she has seen Neurology and is prescribed Bizatriptan which she takes for HA. She has never had CT or imaging of brain. Pt does report neck pain within the past week. Denies neck stiffness or injury.  Denies URI symptoms.  Patient is afebrile, well-appearing in acute distress.  Exam findings above. No neurologic deficits and no meningeal signs.  No sinus tenderness. CT head negative for intracranial bleed or mass.  UPT negative.  UA is negative for infection.  Patient was given IV Benadryl, Toradol Compazine and IM Norflex in the ED.  Upon reevaluation, patient reports she is no longer having headache.  Patient was concerned about elevated blood pressures despite compliance with metoprolol.  She denies  chest pain, shortness of breath, dizziness or lightheadedness.  Instructed patient to continue compliance with her BP meds and follow up with her primary care for further evaluation and management. PCP and Neuro follow up.  Return precautions discussed including worsening symptoms, new symptoms or as needed.  Discussed this patient with Dr. Stuart who agrees with assessment and plan.             Scribe Attestation:   Scribe #1: I performed the above scribed service and the documentation accurately describes the services I performed. I attest to the accuracy of the note.    Attending Attestation:           Physician Attestation for Scribe:  Physician Attestation Statement for Scribe #1: I, Nila Anderson PA-C, reviewed documentation, as scribed by Thanh Stockton in my presence, and it is both accurate and complete.                    Clinical Impression:   The encounter diagnosis was Nonintractable headache, unspecified chronicity pattern, unspecified headache type.                           Nila Anderson PA-C  04/21/18 3378

## 2018-04-21 NOTE — ED TRIAGE NOTES
Pt states she has had a headache since Monday. C/o headache, eye pressure, photophobia, neck pain, nausea, dental pain. Denies vomiting; denies vision change. Pt currently on antibiotics for tooth. Pt took Excedrin around 1300

## 2018-04-22 NOTE — DISCHARGE INSTRUCTIONS
Continue taking Triptan as previously prescribed by Neurology and Excedrin as needed.     STOP taking Ibuprofen. Begin taking Lodine.   Take Robaxin as prescribed (muscle relaxer) for neck pain. Do not take before driving because can cause drowsiness.   Take Zofran as needed for nausea.     Follow up with primary care and Neurology in 2 days.     Return to ER if you develop worsening symptoms or as needed.

## 2018-04-22 NOTE — ED NOTES
Patient hasn't been able to use restroom as of yet, so medications are on hold. Patient given some water and Nurse will reassess patient for voiding needs in about 15 minutes. NP made aware

## 2018-05-15 ENCOUNTER — HOSPITAL ENCOUNTER (EMERGENCY)
Facility: HOSPITAL | Age: 38
Discharge: HOME | End: 2018-05-15
Attending: EMERGENCY MEDICINE
Payer: MEDICAID

## 2018-05-15 VITALS
OXYGEN SATURATION: 97 % | WEIGHT: 215 LBS | SYSTOLIC BLOOD PRESSURE: 124 MMHG | BODY MASS INDEX: 33.74 KG/M2 | HEART RATE: 73 BPM | RESPIRATION RATE: 18 BRPM | HEIGHT: 67 IN | TEMPERATURE: 99 F | DIASTOLIC BLOOD PRESSURE: 76 MMHG
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
## 2018-05-15 DIAGNOSIS — M54.50 ACUTE BILATERAL LOW BACK PAIN WITHOUT SCIATICA: ICD-10-CM

## 2018-05-15 LAB
B-HCG UR QL: NEGATIVE
CTP QC/QA: YES

## 2018-05-15 PROCEDURE — 25000003 PHARM REV CODE 250: Performed by: NURSE PRACTITIONER

## 2018-05-15 PROCEDURE — 99284 EMERGENCY DEPT VISIT MOD MDM: CPT | Mod: 25

## 2018-05-15 PROCEDURE — 81025 URINE PREGNANCY TEST: CPT | Performed by: EMERGENCY MEDICINE

## 2018-05-15 RX ORDER — NAPROXEN 500 MG/1
500 TABLET ORAL
Status: COMPLETED | OUTPATIENT
Start: 2018-05-15 | End: 2018-05-15

## 2018-05-15 RX ORDER — NAPROXEN 500 MG/1
500 TABLET ORAL 2 TIMES DAILY PRN
Qty: 10 TABLET | Refills: 0 | Status: SHIPPED | OUTPATIENT
Start: 2018-05-15 | End: 2018-05-20

## 2018-05-15 RX ORDER — METHOCARBAMOL 500 MG/1
1000 TABLET, FILM COATED ORAL 3 TIMES DAILY PRN
Qty: 18 TABLET | Refills: 0 | Status: SHIPPED | OUTPATIENT
Start: 2018-05-15

## 2018-05-15 RX ADMIN — NAPROXEN 500 MG: 500 TABLET ORAL at 01:05
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
## 2018-05-15 NOTE — DISCHARGE INSTRUCTIONS
Please return to the Emergency Department for any new or worsening symptoms including: worsening or changes in your back pain, fever, chest pain, shortness of breath, loss of consciousness, dizziness, weakness, or any other concerns.     Please follow up with your Primary Care Provider or [facility] within in the week. If you do not have one, you may contact the one listed on your discharge paperwork or you may also call the [facility] Appointment Desk at [phone] to schedule an appointment with one.     Please take all medication as prescribed. You have been prescribed Robaxin for pain. Please do not take this medication while working, drinking alcohol, swimming, or while driving/operating heavy machinery. This medication may cause drowsiness, impair judgment, and reduce physical capabilities.    You have been prescribed Naproxen for pain. This is an Non-Steroidal Anti-Inflammatory (NSAID) Medication. Please do not take any additional NSAIDs while you are taking this medication including (Advil, Aleve, Motrin, Ibuprofen, Mobic\meloxicam, Naprosyn, etc.). Please stop taking this medication if you experience: weakness, itching, yellow skin or eyes, joint pains, vomiting blood, blood or black stools, unusual weight gain, or swelling in your arms, legs, hands, or feet.

## 2019-04-09 PROCEDURE — 99282 PR EMERGENCY DEPT VISIT,LEVEL II: ICD-10-PCS | Mod: ,,, | Performed by: PHYSICIAN ASSISTANT

## 2019-04-09 PROCEDURE — 99282 EMERGENCY DEPT VISIT SF MDM: CPT

## 2019-04-09 PROCEDURE — 99282 EMERGENCY DEPT VISIT SF MDM: CPT | Mod: ,,, | Performed by: PHYSICIAN ASSISTANT

## 2019-04-10 ENCOUNTER — HOSPITAL ENCOUNTER (EMERGENCY)
Facility: HOSPITAL | Age: 39
Discharge: HOME | End: 2019-04-10
Attending: EMERGENCY MEDICINE
Payer: MEDICAID

## 2019-04-10 VITALS
SYSTOLIC BLOOD PRESSURE: 190 MMHG | DIASTOLIC BLOOD PRESSURE: 86 MMHG | TEMPERATURE: 98 F | WEIGHT: 220 LBS | BODY MASS INDEX: 33.34 KG/M2 | RESPIRATION RATE: 16 BRPM | HEART RATE: 76 BPM | HEIGHT: 68 IN | OXYGEN SATURATION: 100 %

## 2019-04-10 DIAGNOSIS — N92.0 MENORRHAGIA WITH REGULAR CYCLE: Primary | ICD-10-CM

## 2019-04-10 LAB
B-HCG UR QL: NEGATIVE
BUN SERPL-MCNC: 13 MG/DL (ref 6–30)
CHLORIDE SERPL-SCNC: 101 MMOL/L (ref 95–110)
CREAT SERPL-MCNC: 0.7 MG/DL (ref 0.5–1.4)
CTP QC/QA: YES
GLUCOSE SERPL-MCNC: 110 MG/DL (ref 70–110)
HCT VFR BLD CALC: 34 %PCV (ref 36–54)
POC IONIZED CALCIUM: 1.2 MMOL/L (ref 1.06–1.42)
POC TCO2 (MEASURED): 28 MMOL/L (ref 23–29)
POTASSIUM BLD-SCNC: 3.5 MMOL/L (ref 3.5–5.1)
SAMPLE: ABNORMAL
SODIUM BLD-SCNC: 141 MMOL/L (ref 136–145)

## 2019-04-10 PROCEDURE — 81025 URINE PREGNANCY TEST: CPT | Performed by: EMERGENCY MEDICINE

## 2019-04-10 NOTE — DISCHARGE INSTRUCTIONS
Follow-up with your OBGYN  Return to the ER for any new symptoms    Our goal in the emergency department is to always give you outstanding care and exceptional service. You may receive a survey by mail or e-mail in the next week regarding your experience in our ED. We would greatly appreciate your completing and returning the survey. Your feedback provides us with a way to recognize our staff who give very good care and it helps us learn how to improve when your experience was below our aspiration of excellence.

## 2019-04-10 NOTE — ED PROVIDER NOTES
Encounter Date: 4/9/2019       History     Chief Complaint   Patient presents with    Female  Problem     C/o LLQ/pelvic pain x 1 week. Currently on menstrual cycle, states much heavier than normal and she was eliminating large clots on 2nd day. Hx tubal ligation.      39-year-old female presents to the emergency department for evaluation of vaginal bleeding.  The patient started her normal menstrual cycle Sunday, April 7th.  She had a normal cycle on Sunday, on Monday the patient cycle became much heavier.  She reports passage of thick clots and going through a tampon every hour.  The symptoms lasted for approximately 24 hr but have improved significantly since then.  She endorses lower abdominal discomfort that has resolved.  She denies ever having a.  Like this before.  She does report a history of a tubal ligation.  She denies any nausea vomiting fever chills chest pain or shortness of breath.        Review of patient's allergies indicates:  No Known Allergies  Past Medical History:   Diagnosis Date    Diabetes mellitus     Hypertension     Migraine headache      Past Surgical History:   Procedure Laterality Date    TUBAL LIGATION       Family History   Problem Relation Age of Onset    Hypertension Mother     Hypertension Father     Diabetes Father      Social History     Tobacco Use    Smoking status: Never Smoker    Smokeless tobacco: Never Used   Substance Use Topics    Alcohol use: Yes     Comment: occ    Drug use: No     Review of Systems   Constitutional: Negative for fever.   HENT: Negative for sore throat.    Respiratory: Negative for shortness of breath.    Cardiovascular: Negative for chest pain.   Gastrointestinal: Negative for nausea.   Genitourinary: Positive for vaginal bleeding. Negative for dysuria.   Musculoskeletal: Negative for back pain.   Skin: Negative for rash.   Neurological: Negative for weakness.   Hematological: Does not bruise/bleed easily.       Physical Exam      Initial Vitals [04/09/19 2346]   BP Pulse Resp Temp SpO2   (!) 185/94 95 16 98.3 °F (36.8 °C) 98 %      MAP       --         Physical Exam    Constitutional: Vital signs are normal. She appears well-developed and well-nourished. She is not diaphoretic. No distress.   HENT:   Head: Normocephalic and atraumatic.   Eyes: Conjunctivae are normal.   Cardiovascular: Normal rate and regular rhythm.   Abdominal: Soft. Normal appearance and bowel sounds are normal.   Abdomen soft and nontender   Musculoskeletal: Normal range of motion.   Neurological: She is alert and oriented to person, place, and time.   Skin: Skin is warm and intact.   Psychiatric: She has a normal mood and affect. Her speech is normal and behavior is normal. Cognition and memory are normal.         ED Course   Procedures  Labs Reviewed   ISTAT PROCEDURE - Abnormal; Notable for the following components:       Result Value    POC Hematocrit 34 (*)     All other components within normal limits   POCT URINE PREGNANCY   ISTAT CHEM8          Imaging Results    None          Medical Decision Making:   History:   I obtained history from: another health care provider.  Initial Assessment:   39-year-old heavy menstrual bleeding  Bleeding improved, no longer passing clots, patient going through a tampon every 4-6 hours  Differential Diagnosis:   Heavy menstrual bleeding, pregnancy, threatened miscarriage, fibroids  Clinical Tests:   Lab Tests: Ordered and Reviewed  ED Management:  Hematocrit 34, UPT negative, bleeding improving  Patient's abdomen is soft and benign  I suspect her symptoms are secondary to heavy menstrual cycle.  Recommended follow-up with OBGYN                      Clinical Impression:       ICD-10-CM ICD-9-CM   1. Menorrhagia with regular cycle N92.0 626.2         Disposition:   Disposition: Discharged  Condition: Stable                        Emanuel Mathis PA-C  04/10/19 0319

## 2019-04-10 NOTE — ED TRIAGE NOTES
Nila Rose, a 39 y.o. female presents to the ED w/ complaint of abnormal menstrual cycle and 1 episode of chills on Sunday. PT stated that cycle started on Sunday and she had constant flow of blood that later charged to silver dollar sized blood clots Sunday night. PT stated she was saturating one heavy flow pad every two hours. PT denies lightheadedness, dizziness, fever, chest pain, SOB, and n/v/d.     Triage note:  Chief Complaint   Patient presents with    Female  Problem     C/o LLQ/pelvic pain x 1 week. Currently on menstrual cycle, states much heavier than normal and she was eliminating large clots on 2nd day. Hx tubal ligation.      Review of patient's allergies indicates:  No Known Allergies  Past Medical History:   Diagnosis Date    Diabetes mellitus     Hypertension     Migraine headache      Adult Physical Assessment  LOC: Nila Rose, 39 y.o. female verified via two identifiers.  The patient is awake, alert, oriented and speaking appropriately at this time.  APPEARANCE: Patient resting comfortably and appears to be in no acute distress at this time. Patient is clean and well groomed, patient's clothing is properly fastened.  SKIN:The skin is warm and dry, color consistent with ethnicity, patient has normal skin turgor and moist mucus membranes, skin intact, no breakdown or brusing noted.  MUSCULOSKELETAL: Patient moving all extremities well, no obvious swelling or deformities noted.  RESPIRATORY: Airway is open and patent, respirations are spontaneous, patient has a normal effort and rate, no accessory muscle use noted.  CARDIAC: Patient has a normal rate and rhythm, no periphreal edema noted in any extremity, capillary refill < 3 seconds in all extremities  ABDOMEN: Soft and non tender to palpation, no abdominal distention noted. Bowel sounds present in all four quadrants.  NEUROLOGIC: Eyes open spontaneously, behavior appropriate to situation, follows commands, facial expression  symmetrical, bilateral hand grasp equal and even, purposeful motor response noted, normal sensation in all extremities when touched with a finger.  : currently on menstrual cycle

## 2019-04-26 ENCOUNTER — HOSPITAL ENCOUNTER (EMERGENCY)
Facility: HOSPITAL | Age: 39
Discharge: HOME | End: 2019-04-26
Attending: EMERGENCY MEDICINE
Payer: MEDICAID

## 2019-04-26 DIAGNOSIS — B00.9 HERPES INFECTION: ICD-10-CM

## 2019-04-26 DIAGNOSIS — N76.0 ACUTE VAGINITIS: Primary | ICD-10-CM

## 2019-04-26 LAB
B-HCG UR QL: NEGATIVE
CTP QC/QA: YES
POCT GLUCOSE: 135 MG/DL (ref 70–110)

## 2019-04-26 PROCEDURE — 86696 HERPES SIMPLEX TYPE 2 TEST: CPT

## 2019-04-26 PROCEDURE — 25000003 PHARM REV CODE 250: Mod: ER | Performed by: EMERGENCY MEDICINE

## 2019-04-26 PROCEDURE — 99283 EMERGENCY DEPT VISIT LOW MDM: CPT | Mod: ER

## 2019-04-26 PROCEDURE — 81025 URINE PREGNANCY TEST: CPT | Mod: ER | Performed by: EMERGENCY MEDICINE

## 2019-04-26 RX ORDER — ACYCLOVIR 200 MG/1
800 CAPSULE ORAL
Status: COMPLETED | OUTPATIENT
Start: 2019-04-26 | End: 2019-04-26

## 2019-04-26 RX ORDER — VALACYCLOVIR HYDROCHLORIDE 1 G/1
1000 TABLET, FILM COATED ORAL 3 TIMES DAILY
Qty: 21 TABLET | Refills: 1 | Status: SHIPPED | OUTPATIENT
Start: 2019-04-26 | End: 2019-05-03

## 2019-04-26 RX ADMIN — ACYCLOVIR 800 MG: 200 CAPSULE ORAL at 09:04

## 2019-04-27 VITALS
WEIGHT: 220.63 LBS | TEMPERATURE: 99 F | SYSTOLIC BLOOD PRESSURE: 154 MMHG | RESPIRATION RATE: 18 BRPM | BODY MASS INDEX: 34.04 KG/M2 | OXYGEN SATURATION: 99 % | HEART RATE: 99 BPM | DIASTOLIC BLOOD PRESSURE: 89 MMHG

## 2019-04-27 NOTE — ED PROVIDER NOTES
"Encounter Date: 4/26/2019    SCRIBE #1 NOTE: I, Silke Hutton, am scribing for, and in the presence of,  Dr. Devi. I have scribed the following portions of the note - Other sections scribed: HPI, ROS, PE.       History     Chief Complaint   Patient presents with    vaginal irritation     pt states she used "Didier Pearls" from online purchase to assist with chronic yeast infxns and she believes they have irritated her. Pt states exterior vagina is red and swollen.     CC: Vaginal Irritation  39 y.o female with HTN and DM presents with vaginal irritation and redness for 3 days. She reports ordering didier pearls that used as a "vaginal detox"  She states using 2 didier pearls and having irritation and swelling 2 days after. She states the pain makes it hard for her to wipe. She denies any vaginal discharge. She takes metformin for her DM.    The history is provided by the patient. No  was used.     Review of patient's allergies indicates:  No Known Allergies  Past Medical History:   Diagnosis Date    Diabetes mellitus     Hypertension     Migraine headache      Past Surgical History:   Procedure Laterality Date    TUBAL LIGATION       Family History   Problem Relation Age of Onset    Hypertension Mother     Hypertension Father     Diabetes Father      Social History     Tobacco Use    Smoking status: Never Smoker    Smokeless tobacco: Never Used   Substance Use Topics    Alcohol use: Yes     Comment: occ    Drug use: No     Review of Systems   Genitourinary: Positive for vaginal pain. Negative for vaginal discharge.        Vaginal redness and swelling    Skin: Positive for color change (redness to the external vagina).   All other systems reviewed and are negative.      Physical Exam     Initial Vitals [04/26/19 2053]   BP Pulse Resp Temp SpO2   (!) 161/92 100 18 98.9 °F (37.2 °C) 99 %      MAP       --         Physical Exam    Nursing note and vitals reviewed.  Constitutional: She appears " well-developed and well-nourished. She is not diaphoretic. She appears distressed.   HENT:   Head: Normocephalic and atraumatic.   Eyes: EOM are normal.   Neck: Normal range of motion. Neck supple.   Cardiovascular: Normal rate, regular rhythm and normal heart sounds. Exam reveals no gallop and no friction rub.    No murmur heard.  Pulmonary/Chest: Breath sounds normal. No respiratory distress. She has no wheezes. She has no rhonchi. She has no rales.   Abdominal: Soft.   Musculoskeletal: Normal range of motion.   Neurological: She is alert and oriented to person, place, and time. No cranial nerve deficit or sensory deficit. GCS score is 15. GCS eye subscore is 4. GCS verbal subscore is 5. GCS motor subscore is 6.   Skin: Skin is warm and dry. Capillary refill takes less than 2 seconds.   Psychiatric: She has a normal mood and affect. Her behavior is normal.     PELVIC exam: 2mm ulceration at the 6 o'clock position of the introitus consistent with herpetic lesions. There is no vaginal discharge.    ED Course   Procedures  Labs Reviewed   HERPES SIMPLEX 1&2 IGG   POCT URINE PREGNANCY   POCT GLUCOSE, HAND-HELD DEVICE          Imaging Results    None                     Scribe Attestation:   Scribe #1: I performed the above scribed service and the documentation accurately describes the services I performed. I attest to the accuracy of the note.    I attest that I personally performed the services documented by the scribe and acknowledged and confirm the content of the note. Malka Devi      MDM:  Patient reports that she has had a monogamous relationship.  I explained to her that sometimes vaginal irritation can mimic herpetic lesions and that I will order a blood test but we will not have the results back tonight.  Treating her for presumed herpes.               Clinical Impression:     1. Acute vaginitis    2. Herpes infection                                   Malka Devi MD  04/26/19 3099

## 2019-05-04 LAB
HSV1 IGG SERPL QL IA: POSITIVE
HSV2 IGG SERPL QL IA: POSITIVE

## 2019-05-16 ENCOUNTER — TELEPHONE (OUTPATIENT)
Dept: SURGERY | Facility: CLINIC | Age: 39
End: 2019-05-16

## 2019-05-16 DIAGNOSIS — E04.1 THYROID NODULE: Primary | ICD-10-CM

## 2019-05-16 NOTE — TELEPHONE ENCOUNTER
Pt has been having trouble swallowing for the past 2 weeks and would like to get her thyroid checked again.   notified.  Order in Epic for thyroid u/s-pt calling to schedule.      ----- Message from Liya Fay sent at 5/16/2019  2:13 PM CDT -----  Contact: Self   Type:  Sooner Appointment Request    Patient is requesting a sooner appointment.  Patient declined first available appointment listed as well as another facility and provider .  Patient will not accept being placed on the waitlist and is requesting a message be sent to doctor.      Symptoms: follow up    Would the patient rather a call back or a response via [facility]? Call back    Best Call Back Number: [phone]

## 2019-05-21 ENCOUNTER — HOSPITAL ENCOUNTER (OUTPATIENT)
Dept: RADIOLOGY | Facility: HOSPITAL | Age: 39
Discharge: HOME | End: 2019-05-21
Attending: SURGERY
Payer: MEDICAID

## 2019-05-21 DIAGNOSIS — E04.1 THYROID NODULE: ICD-10-CM

## 2019-05-21 PROCEDURE — 76536 US EXAM OF HEAD AND NECK: CPT | Mod: 26,,, | Performed by: RADIOLOGY

## 2019-05-21 PROCEDURE — 76536 US SOFT TISSUE HEAD NECK THYROID: ICD-10-PCS | Mod: 26,,, | Performed by: RADIOLOGY

## 2019-05-21 PROCEDURE — 76536 US EXAM OF HEAD AND NECK: CPT | Mod: TC

## 2019-06-04 ENCOUNTER — TELEPHONE (OUTPATIENT)
Dept: SURGERY | Facility: CLINIC | Age: 39
End: 2019-06-04

## 2019-06-04 NOTE — TELEPHONE ENCOUNTER
Pt notified u/s reviewed by  and he would like her to see an endocrinologist.  She is going to see Dr. Hackett to get a referral.      ----- Message from Socorro Reyes sent at 6/4/2019  3:29 PM CDT -----  Contact: Self  Type:  Test Results    Who Called: Nila    Name of Test (Lab/Mammo/Etc): Ultrasound    Date of Test: 05/21/2019    Ordering Provider: Rosalinda    Where the test was performed: [facility]      Would the patient rather a call back or a response via [facility]? Call    Best Call Back Number: [phone]    Additional Information:  Patient called to discuss results and next steps

## 2019-08-21 ENCOUNTER — OFFICE VISIT (OUTPATIENT)
Dept: URGENT CARE | Facility: CLINIC | Age: 39
End: 2019-08-21
Payer: MEDICAID

## 2019-08-21 VITALS
HEART RATE: 87 BPM | OXYGEN SATURATION: 97 % | RESPIRATION RATE: 18 BRPM | WEIGHT: 230 LBS | DIASTOLIC BLOOD PRESSURE: 87 MMHG | TEMPERATURE: 98 F | HEIGHT: 68 IN | BODY MASS INDEX: 34.86 KG/M2 | SYSTOLIC BLOOD PRESSURE: 121 MMHG

## 2019-08-21 DIAGNOSIS — J02.9 SORE THROAT: Primary | ICD-10-CM

## 2019-08-21 LAB
CTP QC/QA: YES
S PYO RRNA THROAT QL PROBE: NEGATIVE

## 2019-08-21 PROCEDURE — 99203 OFFICE O/P NEW LOW 30 MIN: CPT | Mod: S$GLB,,, | Performed by: PHYSICIAN ASSISTANT

## 2019-08-21 PROCEDURE — 87880 POCT RAPID STREP A: ICD-10-PCS | Mod: QW,S$GLB,, | Performed by: PHYSICIAN ASSISTANT

## 2019-08-21 PROCEDURE — 87880 STREP A ASSAY W/OPTIC: CPT | Mod: QW,S$GLB,, | Performed by: PHYSICIAN ASSISTANT

## 2019-08-21 PROCEDURE — 99203 PR OFFICE/OUTPT VISIT, NEW, LEVL III, 30-44 MIN: ICD-10-PCS | Mod: S$GLB,,, | Performed by: PHYSICIAN ASSISTANT

## 2019-08-21 NOTE — PROGRESS NOTES
"Subjective:       Patient ID: Nila Rose is a 39 y.o. female.    Vitals:  height is 5' 7.5" (1.715 m) and weight is 104.3 kg (230 lb). Her temperature is 97.6 °F (36.4 °C). Her blood pressure is 121/87 and her pulse is 87. Her respiration is 18 and oxygen saturation is 97%.     Chief Complaint: Sore Throat    Pt reports sore throat x 1 day. She describes pain is burning and irritated. She attributes pain to PND. She denies congestion, ear pain, fever/chills, cough, n/v. She has not taken anything for her symptoms. Pt does have hx of seasonal allergies.     Sore Throat    This is a new problem. The current episode started yesterday. The problem has been gradually worsening. There has been no fever. The pain is at a severity of 4/10. The pain is mild. Pertinent negatives include no congestion, coughing, ear pain, shortness of breath, stridor, trouble swallowing or vomiting. She has tried nothing for the symptoms. The treatment provided no relief.       Constitution: Negative for chills, sweating, fatigue and fever.   HENT: Positive for postnasal drip and sore throat. Negative for ear pain, congestion, sinus pain, sinus pressure, trouble swallowing and voice change.    Neck: Negative for painful lymph nodes.   Eyes: Negative for eye redness.   Respiratory: Negative for chest tightness, cough, sputum production, bloody sputum, COPD, shortness of breath, stridor, wheezing and asthma.    Gastrointestinal: Negative for nausea and vomiting.   Musculoskeletal: Negative for muscle ache.   Skin: Negative for rash.   Allergic/Immunologic: Negative for seasonal allergies and asthma.   Hematologic/Lymphatic: Negative for swollen lymph nodes.       Objective:      Physical Exam   Constitutional: She is oriented to person, place, and time. She appears well-developed and well-nourished. She is cooperative.  Non-toxic appearance. She does not appear ill. No distress.   HENT:   Head: Normocephalic and atraumatic.   Right Ear: " Hearing, tympanic membrane, external ear and ear canal normal.   Left Ear: Hearing, tympanic membrane, external ear and ear canal normal.   Nose: Nose normal. No mucosal edema, rhinorrhea or nasal deformity. No epistaxis. Right sinus exhibits no maxillary sinus tenderness and no frontal sinus tenderness. Left sinus exhibits no maxillary sinus tenderness and no frontal sinus tenderness.   Mouth/Throat: Uvula is midline and mucous membranes are normal. No trismus in the jaw. Normal dentition. No uvula swelling. Posterior oropharyngeal erythema present. No oropharyngeal exudate or posterior oropharyngeal edema.       Eyes: Conjunctivae and lids are normal. No scleral icterus.   Sclera clear bilat   Neck: Trachea normal, full passive range of motion without pain and phonation normal. Neck supple.   Cardiovascular: Normal rate, regular rhythm, normal heart sounds, intact distal pulses and normal pulses.   Pulmonary/Chest: Effort normal and breath sounds normal. No respiratory distress.   Musculoskeletal: Normal range of motion. She exhibits no edema or deformity.   Neurological: She is alert and oriented to person, place, and time. She exhibits normal muscle tone. Coordination normal.   Skin: Skin is warm, dry and intact. She is not diaphoretic. No pallor.   Psychiatric: She has a normal mood and affect. Her speech is normal and behavior is normal. Judgment and thought content normal. Cognition and memory are normal.   Nursing note and vitals reviewed.        Results for orders placed or performed in visit on 08/21/19   POCT rapid strep A   Result Value Ref Range    Rapid Strep A Screen Negative Negative     Acceptable Yes        Assessment:       1. Sore throat        Plan:         Sore throat  -     POCT rapid strep A  -     (Magic mouthwash) 1:1:1 Benadryl 12.5mg/5ml liq, aluminum & magnesium hydroxide-simehticone (Maalox), lidocaine viscous 2%; Swish and spit 5 mLs every 4 (four) hours as needed.   Dispense: 90 mL; Refill: 0      Self-Care for Sore Throats    Sore throats happen for many reasons, such as colds, allergies, and infections caused by viruses or bacteria. In any case, your throat becomes red and sore. Your goal for self-care is to reduce your discomfort while giving your throat a chance to heal.  Moisten and soothe your throat  Tips include the following:  · Try a sip of water first thing after waking up.  · Keep your throat moist by drinking 6 or more glasses of clear liquids every day.  · Run a cool-air humidifier in your room overnight.  · Avoid cigarette smoke.   · Suck on throat lozenges, cough drops, hard candy, ice chips, or frozen fruit-juice bars. Use the sugar-free versions if your diet or medical condition requires them.  Gargle to ease irritation  Gargling every hour or 2 can ease irritation. Try gargling with 1 of these solutions:  · 1/4 teaspoon of salt in 1/2 cup of warm water  · An over-the-counter anesthetic gargle  Use medicine for more relief  Over-the-counter medicine can reduce sore throat symptoms. Ask your pharmacist if you have questions about which medicine to use:  · Ease pain with anesthetic sprays. Aspirin or an aspirin substitute also helps. Remember, never give aspirin to anyone 18 or younger, or if you are already taking blood thinners.   · For sore throats caused by allergies, try antihistamines to block the allergic reaction.  · Remember: unless a sore throat is caused by a bacterial infection, antibiotics wont help you.  Prevent future sore throats  Prevention tips include the following:  · Stop smoking or reduce contact with secondhand smoke. Smoke irritates the tender throat lining.  · Limit contact with pets and with allergy-causing substances, such as pollen and mold.  · When youre around someone with a sore throat or cold, wash your hands often to keep viruses or bacteria from spreading.  · Dont strain your vocal cords.  Call your healthcare  provider  Contact your healthcare provider if you have:  · A temperature over 101°F (38.3°C)  · White spots on the throat  · Great difficulty swallowing  · Trouble breathing  · A skin rash  · Recent exposure to someone else with strep bacteria  · Severe hoarseness and swollen glands in the neck or jaw   Date Last Reviewed: 8/1/2016  © 5893-3462 [employer]. [address] [place] [address]. All rights reserved. This information is not intended as a substitute for professional medical care. Always follow your healthcare professional's instructions.

## 2019-08-21 NOTE — PATIENT INSTRUCTIONS

## 2020-08-14 ENCOUNTER — OFFICE VISIT (OUTPATIENT)
Dept: URGENT CARE | Facility: CLINIC | Age: 40
End: 2020-08-14
Payer: COMMERCIAL

## 2020-08-14 VITALS
HEART RATE: 106 BPM | DIASTOLIC BLOOD PRESSURE: 85 MMHG | HEIGHT: 69 IN | SYSTOLIC BLOOD PRESSURE: 141 MMHG | TEMPERATURE: 97 F | WEIGHT: 237 LBS | BODY MASS INDEX: 35.1 KG/M2 | OXYGEN SATURATION: 100 % | RESPIRATION RATE: 16 BRPM

## 2020-08-14 DIAGNOSIS — R05.9 COUGH: ICD-10-CM

## 2020-08-14 DIAGNOSIS — B34.9 VIRAL SYNDROME: Primary | ICD-10-CM

## 2020-08-14 PROCEDURE — 99213 PR OFFICE/OUTPT VISIT, EST, LEVL III, 20-29 MIN: ICD-10-PCS | Mod: S$GLB,,, | Performed by: PHYSICIAN ASSISTANT

## 2020-08-14 PROCEDURE — U0003 INFECTIOUS AGENT DETECTION BY NUCLEIC ACID (DNA OR RNA); SEVERE ACUTE RESPIRATORY SYNDROME CORONAVIRUS 2 (SARS-COV-2) (CORONAVIRUS DISEASE [COVID-19]), AMPLIFIED PROBE TECHNIQUE, MAKING USE OF HIGH THROUGHPUT TECHNOLOGIES AS DESCRIBED BY CMS-2020-01-R: HCPCS

## 2020-08-14 PROCEDURE — 99213 OFFICE O/P EST LOW 20 MIN: CPT | Mod: S$GLB,,, | Performed by: PHYSICIAN ASSISTANT

## 2020-08-14 NOTE — LETTER
"Nila"Moiz Joel was seen and treated in our urgent care department on 8/14/2020.     COVID-19 is present in our communities across the state. There is limited testing for COVID at this time, so not all patients can be tested. In this situation, your employee meets the following criteria:    Nila Joel has met the criteria for COVID-19 testing based upon symptoms, travel, and/or potential exposure. The test has been completed and is pending results at this time. During this time the employee is not able to work and should be quarantined per the Centers for Disease Control timelines.     If you have any questions or concerns, or if I can be of further assistance, please do not hesitate to contact me.    Sincerely,             "

## 2020-08-15 NOTE — PATIENT INSTRUCTIONS
"PLEASE READ YOUR DISCHARGE INSTRUCTIONS ENTIRELY AS IT CONTAINS IMPORTANT INFORMATION.  We will call with the results of your COVID-19 testing in 24-72 hours.  Please go home & quarantine until you receive your COVID-19 results.    - Rest.    - Drink plenty of fluids.    - Tylenol or Ibuprofen as directed as needed for fever/pain.    - If you were prescribed antibiotics, please take them to completion.  - If you are female and on birth control pills - please use additional methods of contraception to prevent pregnancy while on antibiotics and for one cycle after.   - If you were prescribed a narcotic medication or muscle relaxer, do not drive or operate heavy equipment or machinery while taking these medications, as they can cause drowsiness.   - If you smoke, please stop smoking.  -You must understand that you've received an Urgent Care treatment only and that you may be released before all your medical problems are known or treated. You, the patient, will    arrange for follow up care as instructed. Please arrange follow up with your primary medical clinic as soon as possible.   - Follow up with your PCP or specialty clinic as directed in the next 1-2 weeks if not improved or as needed.  You can call [phone] to schedule an appointment with the appropriate provider.    - Please return to Urgent Care or to the Emergency Department if your symptoms worsen.    Patient aware and verbalized understanding.    Viral Syndrome (Adult)  A viral illness may cause a number of symptoms. The symptoms depend on the part of the body that the virus affects. If it settles in your nose, throat, and lungs, it may cause cough, sore throat, congestion, and sometimes headache. If it settles in your stomach and intestinal tract, it may cause vomiting and diarrhea. Sometimes it causes vague symptoms like "aching all over," feeling tired, loss of appetite, or fever.  A viral illness usually lasts 1 to 2 weeks, but sometimes it lasts " longer. In some cases, a more serious infection can look like a viral syndrome in the first few days of the illness. You may need another exam and additional tests to know the difference. Watch for the warning signs listed below.  Home care  Follow these guidelines for taking care of yourself at home:  · If symptoms are severe, rest at home for the first 2 to 3 days.  · Stay away from cigarette smoke - both your smoke and the smoke from others.  · You may use over-the-counter acetaminophen or ibuprofen for fever, muscle aching, and headache, unless another medicine was prescribed for this. If you have chronic liver or kidney disease or ever had a stomach ulcer or GI bleeding, talk with your doctor before using these medicines. No one who is younger than 18 and ill with a fever should take aspirin. It may cause severe disease or death.  · Your appetite may be poor, so a light diet is fine. Avoid dehydration by drinking 8 to 12 8-ounce glasses of fluids each day. This may include water; orange juice; lemonade; apple, grape, and cranberry juice; clear fruit drinks; electrolyte replacement and sports drinks; and decaffeinated teas and coffee. If you have been diagnosed with a kidney disease, ask your doctor how much and what types of fluids you should drink to prevent dehydration. If you have kidney disease, drinking too much fluid can cause it build up in the your body and be dangerous to your health.  · Over-the-counter remedies won't shorten the length of the illness but may be helpful for cough, sore throat; and nasal and sinus congestion. Don't use decongestants if you have high blood pressure.  Follow-up care  Follow up with your healthcare provider if you do not improve over the next week.  Call 911  Get emergency medical care if any of the following occur:  · Convulsion  · Feeling weak, dizzy, or like you are going to faint  · Chest pain, shortness of breath, wheezing, or difficulty breathing  When to seek  medical advice  Call your healthcare provider right away if any of these occur:  · Cough with lots of colored sputum (mucus) or blood in your sputum  · Chest pain, shortness of breath, wheezing, or difficulty breathing  · Severe headache; face, neck, or ear pain  · Severe, constant pain in the lower right side of your belly (abdominal)  · Continued vomiting (cant keep liquids down)  · Frequent diarrhea (more than 5 times a day); blood (red or black color) or mucus in diarrhea  · Feeling weak, dizzy, or like you are going to faint  · Extreme thirst  · Fever of 100.4°F (38°C) or higher, or as directed by your healthcare provider  Date Last Reviewed: 9/25/2015 © 2000-2017 [employer]. [address] [place] [address], [place] [address]. All rights reserved. This information is not intended as a substitute for professional medical care. Always follow your healthcare professional's instructions.      Instructions for Patients with Confirmed or Suspected COVID-19    If you are awaiting your test result, you will either be called or it will be released to the patient portal.  If you have any questions about your test, please visit [email or web address] or call our COVID-19 information line at [phone].      Instructions for non-hospitalized or discharged patients with confirmed or suspected COVID-19:       Stay home except to get medical care.    Separate yourself from other people and animals in your home.    Call ahead before visiting your doctor.    Wear a face mask.    Cover your coughs and sneezes.    Clean your hands often.    Avoid sharing personal household items.    Clean all high-touch surfaces every day.    Monitor your symptoms. Seek prompt medical attention if your illness is worsening (e.g., difficulty breathing). Before seeking care, call your healthcare provider.    If you have a medical emergency and must call 911, notify the dispatcher that you have or are being evaluated for  COVID-19. If possible, put on a face mask before emergency medical services arrive.    Use the following symptom-based strategy to return to normal activity following a suspected or confirmed case of COVID-19. Continue isolation until:   o At least 3 days (72 hours) have passed since recovery defined as resolution of fever without the use of fever-reducing medications and improvement in respiratory symptoms (e.g. cough, shortness of breath), and   o At least 10 days have passed since the first positive test.       As one of the next steps, you will receive a call or text from the [place] Department of Health ([facility]) COVID-19 Tracing Team. See the contact information below so you know not to ignore the health departments call. It is important that you contact them back immediately so they can help.     Contact Tracer Number:  [phone]  Caller ID for most carriers: [facility]    What is contact tracing?   Contact tracing is a process that helps identify everyone who has been in close contact with an infected person. Contact tracers let those people know they may have been exposed and guide them on next steps. Confidentiality is important for everyone; no one will be told who may have exposed them to the virus.   Your involvement is important. The more we know about where and how this virus is spreading, the better chance we have at stopping it from spreading further.  What does exposure mean?   Exposure means you have been within 6 feet for more than 15 minutes with a person who has or had COVID-19.  What kind of questions do the contact tracers ask?   A contact tracer will confirm your basic contact information including name, address, phone number, and next of kin, as well as asking about any symptoms you may have had. Theyll also ask you how you think you may have gotten sick, such as places where you may have been exposed to the virus, and people you were with. Those names will never be shared  with anyone outside of that call, and will only be used to help trace and stop the spread of the virus.   I have privacy concerns. How will the state use my information?   Your privacy about your health is important. All calls are completed using call centers that use the appropriate health privacy protection measures (HIPAA compliance), meaning that your patient information is safe. No one will ever ask you any questions related to immigration status. Your health comes first.   Do I have to participate?   You do not have to participate, but we strongly encourage you to. Contact tracing can help us catch and control new outbreaks as theyre developing to keep your friends and family safe.   What if I dont hear from anyone?   If you dont receive a call within 24 hours, you can call the number above right away to inquire about your status. That line is open from 8:00 am - 8:00 p.m., 7 days a week.  Contact tracing saves lives! Together, we have the power to beat this virus and keep our loved ones and neighbors safe.       Instructions for household members, intimate partners and caregivers in a non-healthcare setting of a patient with confirmed or suspected COVID-19:         Close contacts should monitor their health and call their healthcare provider right away if they develop symptoms suggestive of COVID-19 (e.g., fever, cough, shortness of breath).    Stay home except to get medical care. Separate yourself from other people and animals in the home.   Monitor the patients symptoms. If the patient is getting sicker, call his or her healthcare provider. If the patient has a medical emergency and you need to call 911, notify the dispatch personnel that the patient has or is being evaluated for COVID-19.    Wear a facemask when around other people such as sharing a room or vehicle and before entering a healthcare provider's office.   Cover coughs and sneezes with a tissue. Throw used tissues in a lined trash  can immediately and wash hands.   Clean hands often with soap and water for at least 20 seconds or with an alcohol-based hand , rubbing hands together until they feel dry. Avoid touching your eyes, nose, and mouth with unwashed hands.   Clean all high-touch; surfaces every day, including counters, tabletops, doorknobs, bathroom fixtures, toilets, phones, keyboards, tablets, bedside tables, etc. Use a household cleaning spray or wipe according to label instructions.   Avoid sharing personal household items such as dishes, drinking glasses, cups, towels, bedding, etc. After these items are used, they should be washed thoroughly with soap and water.   Continue isolation until:   At least 3 days (72 hours) have passed since recovery defined as resolution of fever without the use of fever-reducing medications and improvement in respiratory symptoms (e.g. cough, shortness of breath), and    At least 10 days have passed since the patients first positive test.    https://www.cdc.gov/coronavirus/2019-ncov/your-health/index.htm

## 2020-08-15 NOTE — PROGRESS NOTES
"Work  Subjective:       Patient ID: Nila Joel is a 40 y.o. female.    Vitals:  height is 5' 9" (1.753 m) and weight is 107.5 kg (237 lb). Her temperature is 97.2 °F (36.2 °C). Her blood pressure is 141/85 (abnormal) and her pulse is 106. Her respiration is 16 and oxygen saturation is 100%.     Chief Complaint: COVID-19 Concerns and Cough    Ms. Joel presents for evaluation of light cough & scratchy throat for the past 5 days.  She was exposed to someone with COVID-19 last week.  She denies any fevers, chills, headache, congestion, shortness of breath, chest pain, leg swelling, nausea, vomiting, diarrhea.  She has not taken anything for her symptoms.        Cough  This is a new problem. Episode onset: 5 days. The problem has been unchanged. The problem occurs every few minutes. The cough is non-productive. Associated symptoms include a sore throat. Pertinent negatives include no chest pain, chills, ear pain, eye redness, fever, headaches, hemoptysis, myalgias, rash, shortness of breath or wheezing. Nothing aggravates the symptoms. She has tried nothing for the symptoms. Her past medical history is significant for bronchitis.       Constitution: Negative for chills, sweating, fatigue and fever.   HENT: Positive for sore throat. Negative for ear pain, ear discharge, congestion, sinus pain, sinus pressure and voice change.    Neck: Negative for painful lymph nodes.   Cardiovascular: Negative for chest trauma, chest pain, leg swelling, palpitations, sob on exertion and passing out.   Eyes: Negative for eye redness.   Respiratory: Positive for cough. Negative for chest tightness, sputum production, bloody sputum, COPD, shortness of breath, stridor, wheezing and asthma.    Gastrointestinal: Negative for abdominal pain, nausea, vomiting and diarrhea.   Musculoskeletal: Negative for muscle ache.   Skin: Negative for rash.   Allergic/Immunologic: Negative for seasonal allergies and asthma.   Neurological: Negative " for dizziness and headaches.   Hematologic/Lymphatic: Negative for swollen lymph nodes.       Objective:      Physical Exam   Constitutional: She is oriented to person, place, and time. She appears well-developed. She is cooperative.  Non-toxic appearance. She does not appear ill. No distress.   HENT:   Head: Normocephalic and atraumatic.   Ears:   Right Ear: Hearing, tympanic membrane, external ear and ear canal normal.   Left Ear: Hearing, tympanic membrane, external ear and ear canal normal.   Nose: Nose normal. No mucosal edema, rhinorrhea or nasal deformity. No epistaxis. Right sinus exhibits no maxillary sinus tenderness and no frontal sinus tenderness. Left sinus exhibits no maxillary sinus tenderness and no frontal sinus tenderness.   Mouth/Throat: Uvula is midline, oropharynx is clear and moist and mucous membranes are normal. No trismus in the jaw. Normal dentition. No uvula swelling. No oropharyngeal exudate, posterior oropharyngeal edema or posterior oropharyngeal erythema.   Eyes: Conjunctivae and lids are normal. No scleral icterus.   Neck: Trachea normal, full passive range of motion without pain and phonation normal. Neck supple. No neck rigidity. No edema and no erythema present.   Cardiovascular: Normal rate, regular rhythm, normal heart sounds and normal pulses.   Pulmonary/Chest: Effort normal and breath sounds normal. No respiratory distress. She has no decreased breath sounds. She has no rhonchi.   Abdominal: Normal appearance.   Musculoskeletal: Normal range of motion.         General: No deformity.   Neurological: She is alert and oriented to person, place, and time. She exhibits normal muscle tone. Coordination normal.   Skin: Skin is warm, dry, intact, not diaphoretic and not pale. Psychiatric: Her speech is normal and behavior is normal. Judgment and thought content normal.   Nursing note and vitals reviewed.        Assessment:       1. Viral syndrome    2. Cough        Plan:         Viral  "syndrome    Cough  -     COVID-19 Routine Screening      Patient Instructions   PLEASE READ YOUR DISCHARGE INSTRUCTIONS ENTIRELY AS IT CONTAINS IMPORTANT INFORMATION.  We will call with the results of your COVID-19 testing in 24-72 hours.  Please go home & quarantine until you receive your COVID-19 results.    - Rest.    - Drink plenty of fluids.    - Tylenol or Ibuprofen as directed as needed for fever/pain.    - If you were prescribed antibiotics, please take them to completion.  - If you are female and on birth control pills - please use additional methods of contraception to prevent pregnancy while on antibiotics and for one cycle after.   - If you were prescribed a narcotic medication or muscle relaxer, do not drive or operate heavy equipment or machinery while taking these medications, as they can cause drowsiness.   - If you smoke, please stop smoking.  -You must understand that you've received an Urgent Care treatment only and that you may be released before all your medical problems are known or treated. You, the patient, will    arrange for follow up care as instructed. Please arrange follow up with your primary medical clinic as soon as possible.   - Follow up with your PCP or specialty clinic as directed in the next 1-2 weeks if not improved or as needed.  You can call [phone] to schedule an appointment with the appropriate provider.    - Please return to Urgent Care or to the Emergency Department if your symptoms worsen.    Patient aware and verbalized understanding.    Viral Syndrome (Adult)  A viral illness may cause a number of symptoms. The symptoms depend on the part of the body that the virus affects. If it settles in your nose, throat, and lungs, it may cause cough, sore throat, congestion, and sometimes headache. If it settles in your stomach and intestinal tract, it may cause vomiting and diarrhea. Sometimes it causes vague symptoms like "aching all over," feeling tired, loss of " appetite, or fever.  A viral illness usually lasts 1 to 2 weeks, but sometimes it lasts longer. In some cases, a more serious infection can look like a viral syndrome in the first few days of the illness. You may need another exam and additional tests to know the difference. Watch for the warning signs listed below.  Home care  Follow these guidelines for taking care of yourself at home:  · If symptoms are severe, rest at home for the first 2 to 3 days.  · Stay away from cigarette smoke - both your smoke and the smoke from others.  · You may use over-the-counter acetaminophen or ibuprofen for fever, muscle aching, and headache, unless another medicine was prescribed for this. If you have chronic liver or kidney disease or ever had a stomach ulcer or GI bleeding, talk with your doctor before using these medicines. No one who is younger than 18 and ill with a fever should take aspirin. It may cause severe disease or death.  · Your appetite may be poor, so a light diet is fine. Avoid dehydration by drinking 8 to 12 8-ounce glasses of fluids each day. This may include water; orange juice; lemonade; apple, grape, and cranberry juice; clear fruit drinks; electrolyte replacement and sports drinks; and decaffeinated teas and coffee. If you have been diagnosed with a kidney disease, ask your doctor how much and what types of fluids you should drink to prevent dehydration. If you have kidney disease, drinking too much fluid can cause it build up in the your body and be dangerous to your health.  · Over-the-counter remedies won't shorten the length of the illness but may be helpful for cough, sore throat; and nasal and sinus congestion. Don't use decongestants if you have high blood pressure.  Follow-up care  Follow up with your healthcare provider if you do not improve over the next week.  Call 911  Get emergency medical care if any of the following occur:  · Convulsion  · Feeling weak, dizzy, or like you are going to  faint  · Chest pain, shortness of breath, wheezing, or difficulty breathing  When to seek medical advice  Call your healthcare provider right away if any of these occur:  · Cough with lots of colored sputum (mucus) or blood in your sputum  · Chest pain, shortness of breath, wheezing, or difficulty breathing  · Severe headache; face, neck, or ear pain  · Severe, constant pain in the lower right side of your belly (abdominal)  · Continued vomiting (cant keep liquids down)  · Frequent diarrhea (more than 5 times a day); blood (red or black color) or mucus in diarrhea  · Feeling weak, dizzy, or like you are going to faint  · Extreme thirst  · Fever of 100.4°F (38°C) or higher, or as directed by your healthcare provider  Date Last Reviewed: 9/25/2015 © 2000-2017 [employer]. [address] [place] [address]. All rights reserved. This information is not intended as a substitute for professional medical care. Always follow your healthcare professional's instructions.      Instructions for Patients with Confirmed or Suspected COVID-19    If you are awaiting your test result, you will either be called or it will be released to the patient portal.  If you have any questions about your test, please visit [email or web address] or call our COVID-19 information line at [phone].      Instructions for non-hospitalized or discharged patients with confirmed or suspected COVID-19:       Stay home except to get medical care.    Separate yourself from other people and animals in your home.    Call ahead before visiting your doctor.    Wear a face mask.    Cover your coughs and sneezes.    Clean your hands often.    Avoid sharing personal household items.    Clean all high-touch surfaces every day.    Monitor your symptoms. Seek prompt medical attention if your illness is worsening (e.g., difficulty breathing). Before seeking care, call your healthcare provider.    If you have a medical  emergency and must call 911, notify the dispatcher that you have or are being evaluated for COVID-19. If possible, put on a face mask before emergency medical services arrive.    Use the following symptom-based strategy to return to normal activity following a suspected or confirmed case of COVID-19. Continue isolation until:   o At least 3 days (72 hours) have passed since recovery defined as resolution of fever without the use of fever-reducing medications and improvement in respiratory symptoms (e.g. cough, shortness of breath), and   o At least 10 days have passed since the first positive test.       As one of the next steps, you will receive a call or text from the [place] Department of Health ([facility]) COVID-19 Tracing Team. See the contact information below so you know not to ignore the health departments call. It is important that you contact them back immediately so they can help.     Contact Tracer Number:  [phone]  Caller ID for most carriers: [facility]    What is contact tracing?   Contact tracing is a process that helps identify everyone who has been in close contact with an infected person. Contact tracers let those people know they may have been exposed and guide them on next steps. Confidentiality is important for everyone; no one will be told who may have exposed them to the virus.   Your involvement is important. The more we know about where and how this virus is spreading, the better chance we have at stopping it from spreading further.  What does exposure mean?   Exposure means you have been within 6 feet for more than 15 minutes with a person who has or had COVID-19.  What kind of questions do the contact tracers ask?   A contact tracer will confirm your basic contact information including name, address, phone number, and next of kin, as well as asking about any symptoms you may have had. Theyll also ask you how you think you may have gotten sick, such as places where you may  have been exposed to the virus, and people you were with. Those names will never be shared with anyone outside of that call, and will only be used to help trace and stop the spread of the virus.   I have privacy concerns. How will the state use my information?   Your privacy about your health is important. All calls are completed using call centers that use the appropriate health privacy protection measures (HIPAA compliance), meaning that your patient information is safe. No one will ever ask you any questions related to immigration status. Your health comes first.   Do I have to participate?   You do not have to participate, but we strongly encourage you to. Contact tracing can help us catch and control new outbreaks as theyre developing to keep your friends and family safe.   What if I dont hear from anyone?   If you dont receive a call within 24 hours, you can call the number above right away to inquire about your status. That line is open from 8:00 am - 8:00 p.m., 7 days a week.  Contact tracing saves lives! Together, we have the power to beat this virus and keep our loved ones and neighbors safe.       Instructions for household members, intimate partners and caregivers in a non-healthcare setting of a patient with confirmed or suspected COVID-19:         Close contacts should monitor their health and call their healthcare provider right away if they develop symptoms suggestive of COVID-19 (e.g., fever, cough, shortness of breath).    Stay home except to get medical care. Separate yourself from other people and animals in the home.   Monitor the patients symptoms. If the patient is getting sicker, call his or her healthcare provider. If the patient has a medical emergency and you need to call 911, notify the dispatch personnel that the patient has or is being evaluated for COVID-19.    Wear a facemask when around other people such as sharing a room or vehicle and before entering a healthcare  provider's office.   Cover coughs and sneezes with a tissue. Throw used tissues in a lined trash can immediately and wash hands.   Clean hands often with soap and water for at least 20 seconds or with an alcohol-based hand , rubbing hands together until they feel dry. Avoid touching your eyes, nose, and mouth with unwashed hands.   Clean all high-touch; surfaces every day, including counters, tabletops, doorknobs, bathroom fixtures, toilets, phones, keyboards, tablets, bedside tables, etc. Use a household cleaning spray or wipe according to label instructions.   Avoid sharing personal household items such as dishes, drinking glasses, cups, towels, bedding, etc. After these items are used, they should be washed thoroughly with soap and water.   Continue isolation until:   At least 3 days (72 hours) have passed since recovery defined as resolution of fever without the use of fever-reducing medications and improvement in respiratory symptoms (e.g. cough, shortness of breath), and    At least 10 days have passed since the patients first positive test.    https://www.cdc.gov/coronavirus/2019-ncov/your-health/index.htm

## 2020-08-18 LAB — SARS-COV-2 RNA RESP QL NAA+PROBE: NOT DETECTED

## 2020-08-19 ENCOUNTER — TELEPHONE (OUTPATIENT)
Dept: URGENT CARE | Facility: CLINIC | Age: 40
End: 2020-08-19

## 2020-08-22 ENCOUNTER — HOSPITAL ENCOUNTER (EMERGENCY)
Facility: HOSPITAL | Age: 40
Discharge: HOME | End: 2020-08-22
Attending: EMERGENCY MEDICINE
Payer: COMMERCIAL

## 2020-08-22 VITALS
SYSTOLIC BLOOD PRESSURE: 140 MMHG | HEIGHT: 69 IN | DIASTOLIC BLOOD PRESSURE: 88 MMHG | OXYGEN SATURATION: 99 % | RESPIRATION RATE: 18 BRPM | TEMPERATURE: 98 F | WEIGHT: 236 LBS | BODY MASS INDEX: 34.96 KG/M2 | HEART RATE: 84 BPM

## 2020-08-22 DIAGNOSIS — S99.921A RIGHT FOOT INJURY, INITIAL ENCOUNTER: ICD-10-CM
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
## 2020-08-22 LAB
B-HCG UR QL: NEGATIVE
CTP QC/QA: YES

## 2020-08-22 PROCEDURE — 81025 URINE PREGNANCY TEST: CPT | Performed by: NURSE PRACTITIONER

## 2020-08-22 PROCEDURE — 25000003 PHARM REV CODE 250: Performed by: NURSE PRACTITIONER

## 2020-08-22 PROCEDURE — 99283 EMERGENCY DEPT VISIT LOW MDM: CPT | Mod: 25

## 2020-08-22 RX ORDER — NAPROXEN 250 MG/1
250 TABLET ORAL
Status: COMPLETED | OUTPATIENT
Start: 2020-08-22 | End: 2020-08-22

## 2020-08-22 RX ADMIN — NAPROXEN 250 MG: 250 TABLET ORAL at 05:08

## 2020-08-22 RX ADMIN — BACITRACIN, NEOMYCIN, POLYMYXIN B 1 EACH: 400; 3.5; 5 OINTMENT TOPICAL at 05:08
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
## 2020-08-22 NOTE — DISCHARGE INSTRUCTIONS
Voltaren gel or salonpas patches over the counter for pain.  Return to the Emergency department for any worsening or failure to improve, otherwise follow up with your primary care provider.
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
## 2020-08-27 ENCOUNTER — TELEPHONE (OUTPATIENT)
Dept: SURGERY | Facility: CLINIC | Age: 40
End: 2020-08-27

## 2020-08-27 NOTE — TELEPHONE ENCOUNTER
Attempted to contact pt, pt did not answer vm left.     ----- Message from Socorro Reyes sent at 8/26/2020  4:30 PM CDT -----  Regarding: Advice  Contact: Nila  Type: Patient Call Back    Who called:Nila    What is the request in detail:Patient called to be advised if Dr. Tellez removes ganglion cyst on wrist. Please call to advise    Can the clinic reply by [facility]?    Would the patient rather a call back or a response via [facility]? Call    Best call back number:[phone]

## 2021-02-02 ENCOUNTER — LAB VISIT (OUTPATIENT)
Dept: LAB | Facility: OTHER | Age: 41
End: 2021-02-02
Payer: COMMERCIAL

## 2021-02-02 DIAGNOSIS — Z20.822 ENCOUNTER FOR LABORATORY TESTING FOR COVID-19 VIRUS: ICD-10-CM

## 2021-02-02 PROCEDURE — U0003 INFECTIOUS AGENT DETECTION BY NUCLEIC ACID (DNA OR RNA); SEVERE ACUTE RESPIRATORY SYNDROME CORONAVIRUS 2 (SARS-COV-2) (CORONAVIRUS DISEASE [COVID-19]), AMPLIFIED PROBE TECHNIQUE, MAKING USE OF HIGH THROUGHPUT TECHNOLOGIES AS DESCRIBED BY CMS-2020-01-R: HCPCS

## 2021-02-03 LAB — SARS-COV-2 RNA RESP QL NAA+PROBE: NOT DETECTED

## 2021-02-09 DIAGNOSIS — Z20.822 ENCOUNTER FOR LABORATORY TESTING FOR COVID-19 VIRUS: ICD-10-CM

## 2021-02-23 ENCOUNTER — LAB VISIT (OUTPATIENT)
Dept: LAB | Facility: OTHER | Age: 41
End: 2021-02-23
Payer: COMMERCIAL

## 2021-02-23 DIAGNOSIS — Z20.822 ENCOUNTER FOR LABORATORY TESTING FOR COVID-19 VIRUS: ICD-10-CM

## 2021-02-23 PROCEDURE — U0003 INFECTIOUS AGENT DETECTION BY NUCLEIC ACID (DNA OR RNA); SEVERE ACUTE RESPIRATORY SYNDROME CORONAVIRUS 2 (SARS-COV-2) (CORONAVIRUS DISEASE [COVID-19]), AMPLIFIED PROBE TECHNIQUE, MAKING USE OF HIGH THROUGHPUT TECHNOLOGIES AS DESCRIBED BY CMS-2020-01-R: HCPCS

## 2021-02-24 LAB — SARS-COV-2 RNA RESP QL NAA+PROBE: NOT DETECTED

## 2021-05-06 ENCOUNTER — PATIENT MESSAGE (OUTPATIENT)
Dept: RESEARCH | Facility: HOSPITAL | Age: 41
End: 2021-05-06

## 2022-06-23 ENCOUNTER — OCCUPATIONAL HEALTH (OUTPATIENT)
Dept: URGENT CARE | Facility: CLINIC | Age: 42
End: 2022-06-23

## 2022-06-23 PROCEDURE — 80305 PR COLLECTION ONLY DRUG SCREEN: ICD-10-PCS | Mod: S$GLB,,, | Performed by: NURSE PRACTITIONER

## 2022-06-23 PROCEDURE — 80305 DRUG TEST PRSMV DIR OPT OBS: CPT | Mod: S$GLB,,, | Performed by: NURSE PRACTITIONER

## 2022-11-11 ENCOUNTER — OCCUPATIONAL HEALTH (OUTPATIENT)
Dept: URGENT CARE | Facility: CLINIC | Age: 42
End: 2022-11-11

## 2022-11-11 PROCEDURE — 80305 PR COLLECTION ONLY DRUG SCREEN: ICD-10-PCS | Mod: S$GLB,,, | Performed by: NURSE PRACTITIONER

## 2022-11-11 PROCEDURE — 80305 DRUG TEST PRSMV DIR OPT OBS: CPT | Mod: S$GLB,,, | Performed by: NURSE PRACTITIONER

## 2023-08-03 ENCOUNTER — PATIENT MESSAGE (OUTPATIENT)
Dept: INTERNAL MEDICINE | Facility: CLINIC | Age: 43
End: 2023-08-03

## 2023-08-08 ENCOUNTER — HOSPITAL ENCOUNTER (OUTPATIENT)
Dept: RADIOLOGY | Facility: HOSPITAL | Age: 43
Discharge: HOME | End: 2023-08-08
Attending: FAMILY MEDICINE
Payer: COMMERCIAL

## 2023-08-08 DIAGNOSIS — Z12.31 BREAST CANCER SCREENING BY MAMMOGRAM: ICD-10-CM

## 2023-08-08 PROCEDURE — 77067 SCR MAMMO BI INCL CAD: CPT | Mod: TC,PO

## 2023-08-22 ENCOUNTER — PATIENT MESSAGE (OUTPATIENT)
Dept: INTERNAL MEDICINE | Facility: CLINIC | Age: 43
End: 2023-08-22

## 2023-11-22 ENCOUNTER — HOSPITAL ENCOUNTER (EMERGENCY)
Facility: HOSPITAL | Age: 43
Discharge: HOME | End: 2023-11-22
Attending: EMERGENCY MEDICINE
Payer: COMMERCIAL

## 2023-11-22 VITALS
DIASTOLIC BLOOD PRESSURE: 73 MMHG | WEIGHT: 226 LBS | RESPIRATION RATE: 18 BRPM | TEMPERATURE: 98 F | HEART RATE: 95 BPM | SYSTOLIC BLOOD PRESSURE: 139 MMHG | BODY MASS INDEX: 35.47 KG/M2 | OXYGEN SATURATION: 98 % | HEIGHT: 67 IN

## 2023-11-22 DIAGNOSIS — B34.9 VIRAL SYNDROME: Primary | ICD-10-CM

## 2023-11-22 LAB
ADENOVIRUS: NOT DETECTED
B-HCG UR QL: NEGATIVE
BORDETELLA PARAPERTUSSIS (IS1001): NOT DETECTED
BORDETELLA PERTUSSIS (PTXP): NOT DETECTED
CHLAMYDIA PNEUMONIAE: NOT DETECTED
CORONAVIRUS 229E, COMMON COLD VIRUS: NOT DETECTED
CORONAVIRUS HKU1, COMMON COLD VIRUS: NOT DETECTED
CORONAVIRUS NL63, COMMON COLD VIRUS: NOT DETECTED
CORONAVIRUS OC43, COMMON COLD VIRUS: DETECTED
CTP QC/QA: YES
FLUBV RNA NPH QL NAA+NON-PROBE: NOT DETECTED
HPIV1 RNA NPH QL NAA+NON-PROBE: NOT DETECTED
HPIV2 RNA NPH QL NAA+NON-PROBE: NOT DETECTED
HPIV3 RNA NPH QL NAA+NON-PROBE: NOT DETECTED
HPIV4 RNA NPH QL NAA+NON-PROBE: NOT DETECTED
HUMAN METAPNEUMOVIRUS: NOT DETECTED
INFLUENZA A (SUBTYPES H1,H1-2009,H3): NOT DETECTED
MYCOPLASMA PNEUMONIAE: NOT DETECTED
RESPIRATORY INFECTION PANEL SOURCE: ABNORMAL
RSV RNA NPH QL NAA+NON-PROBE: NOT DETECTED
RV+EV RNA NPH QL NAA+NON-PROBE: NOT DETECTED
SARS-COV-2 RNA RESP QL NAA+PROBE: NOT DETECTED

## 2023-11-22 PROCEDURE — 81025 URINE PREGNANCY TEST: CPT | Performed by: EMERGENCY MEDICINE

## 2023-11-22 PROCEDURE — 87798 DETECT AGENT NOS DNA AMP: CPT | Mod: 59 | Performed by: EMERGENCY MEDICINE

## 2023-11-22 PROCEDURE — 87633 RESP VIRUS 12-25 TARGETS: CPT | Performed by: EMERGENCY MEDICINE

## 2023-11-22 PROCEDURE — 99282 EMERGENCY DEPT VISIT SF MDM: CPT

## 2023-11-22 NOTE — ED PROVIDER NOTES
Encounter Date: 11/22/2023       History     Chief Complaint   Patient presents with    Generalized Body Aches     Body aches and cough starting Tuesday morning     Chief complaint is flu-like symptoms for 24 hours.  Patient complains of nasal congestion mild sore throat body aches cough sinus congestion.  She is a healthcare professional.        Review of patient's allergies indicates:  No Known Allergies  Past Medical History:   Diagnosis Date    Diabetes mellitus     Hypertension     Migraine headache      Past Surgical History:   Procedure Laterality Date    TUBAL LIGATION       Family History   Problem Relation Age of Onset    Hypertension Mother     Hypertension Father     Diabetes Father      Social History     Tobacco Use    Smoking status: Never    Smokeless tobacco: Never   Substance Use Topics    Alcohol use: Yes     Comment: occ    Drug use: No     Review of Systems   Constitutional:  Positive for fever. Negative for chills.   HENT:  Positive for rhinorrhea. Negative for ear pain and sore throat.    Eyes:  Negative for pain and visual disturbance.   Respiratory:  Positive for cough. Negative for shortness of breath.    Cardiovascular:  Negative for chest pain and palpitations.   Gastrointestinal:  Negative for abdominal pain, constipation, diarrhea, nausea and vomiting.   Genitourinary:  Negative for dysuria, frequency, hematuria and urgency.   Musculoskeletal:  Positive for back pain. Negative for joint swelling and myalgias.   Skin:  Negative for rash.   Neurological:  Negative for dizziness, seizures, weakness and headaches.   Psychiatric/Behavioral:  Negative for dysphoric mood. The patient is not nervous/anxious.        Physical Exam     Initial Vitals [11/22/23 0453]   BP Pulse Resp Temp SpO2   (!) 153/84 107 18 98.8 °F (37.1 °C) 98 %      MAP       --         Physical Exam    Nursing note and vitals reviewed.  Constitutional: She appears well-developed and well-nourished.   HENT:   Head:  Normocephalic and atraumatic.   Eyes: Conjunctivae, EOM and lids are normal. Pupils are equal, round, and reactive to light.   Neck: Trachea normal. Neck supple. No thyroid mass and no thyromegaly present.   Normal range of motion.  Cardiovascular:  Normal rate, regular rhythm and normal heart sounds.           Pulmonary/Chest: Effort normal and breath sounds normal.   Abdominal: Abdomen is soft. There is no abdominal tenderness.   Musculoskeletal:         General: Normal range of motion.      Cervical back: Normal range of motion and neck supple.     Neurological: She is alert and oriented to person, place, and time. She has normal strength and normal reflexes. No cranial nerve deficit or sensory deficit.   Skin: Skin is warm and dry.   Psychiatric: She has a normal mood and affect. Her speech is normal and behavior is normal. Judgment and thought content normal.         ED Course   Procedures  Labs Reviewed   RESPIRATORY INFECTION PANEL (PCR), NASOPHARYNGEAL - Abnormal; Notable for the following components:       Result Value    Coronavirus OC43, Common Cold Virus Detected (*)     All other components within normal limits    Narrative:     Respiratory Infection Panel source->NP Swab   POCT URINE PREGNANCY          Imaging Results    None          Medications - No data to display  Medical Decision Making  The patient was discharged and was to await a phone call by Dr. Vleez.  She ended up having a positive corona virus test and she has a viral infection.  She was given discharge for viral infection type presentation.    Amount and/or Complexity of Data Reviewed  Labs: ordered.                                   Clinical Impression:  Final diagnoses:  [B34.9] Viral syndrome (Primary)          ED Disposition Condition    Discharge Stable          ED Prescriptions    None       Follow-up Information    None          Reba Cardoso MD  11/23/23 0138

## 2024-11-29 ENCOUNTER — HOSPITAL ENCOUNTER (EMERGENCY)
Facility: HOSPITAL | Age: 44
Discharge: HOME | End: 2024-11-29
Attending: EMERGENCY MEDICINE
Payer: COMMERCIAL

## 2024-11-29 VITALS
RESPIRATION RATE: 20 BRPM | BODY MASS INDEX: 35.47 KG/M2 | HEART RATE: 82 BPM | SYSTOLIC BLOOD PRESSURE: 156 MMHG | TEMPERATURE: 97 F | DIASTOLIC BLOOD PRESSURE: 72 MMHG | WEIGHT: 226 LBS | OXYGEN SATURATION: 100 % | HEIGHT: 67 IN

## 2024-11-29 DIAGNOSIS — G43.009 MIGRAINE WITHOUT AURA AND WITHOUT STATUS MIGRAINOSUS, NOT INTRACTABLE: ICD-10-CM

## 2024-11-29 DIAGNOSIS — I10 HYPERTENSION: Primary | ICD-10-CM

## 2024-11-29 LAB
ALBUMIN SERPL BCP-MCNC: 3.4 G/DL (ref 3.5–5.2)
ALP SERPL-CCNC: 102 U/L (ref 40–150)
ALT SERPL W/O P-5'-P-CCNC: 18 U/L (ref 10–44)
ANION GAP SERPL CALC-SCNC: 10 MMOL/L (ref 8–16)
AST SERPL-CCNC: 12 U/L (ref 10–40)
B-HCG UR QL: NEGATIVE
BASOPHILS # BLD AUTO: 0.02 K/UL (ref 0–0.2)
BASOPHILS NFR BLD: 0.4 % (ref 0–1.9)
BILIRUB SERPL-MCNC: 0.5 MG/DL (ref 0.1–1)
BILIRUB UR QL STRIP: NEGATIVE
BUN SERPL-MCNC: 8 MG/DL (ref 6–20)
CALCIUM SERPL-MCNC: 8.8 MG/DL (ref 8.7–10.5)
CHLORIDE SERPL-SCNC: 102 MMOL/L (ref 95–110)
CLARITY UR: CLEAR
CO2 SERPL-SCNC: 26 MMOL/L (ref 23–29)
COLOR UR: YELLOW
CREAT SERPL-MCNC: 0.8 MG/DL (ref 0.5–1.4)
CTP QC/QA: YES
DIFFERENTIAL METHOD BLD: ABNORMAL
EOSINOPHIL # BLD AUTO: 0.1 K/UL (ref 0–0.5)
EOSINOPHIL NFR BLD: 1.2 % (ref 0–8)
ERYTHROCYTE [DISTWIDTH] IN BLOOD BY AUTOMATED COUNT: 12.6 % (ref 11.5–14.5)
EST. GFR  (NO RACE VARIABLE): >60 ML/MIN/1.73 M^2
GLUCOSE SERPL-MCNC: 166 MG/DL (ref 70–110)
GLUCOSE UR QL STRIP: NEGATIVE
HCT VFR BLD AUTO: 36.1 % (ref 37–48.5)
HGB BLD-MCNC: 12 G/DL (ref 12–16)
HGB UR QL STRIP: NEGATIVE
IMM GRANULOCYTES # BLD AUTO: 0.01 K/UL (ref 0–0.04)
IMM GRANULOCYTES NFR BLD AUTO: 0.2 % (ref 0–0.5)
KETONES UR QL STRIP: NEGATIVE
LEUKOCYTE ESTERASE UR QL STRIP: NEGATIVE
LYMPHOCYTES # BLD AUTO: 1.6 K/UL (ref 1–4.8)
LYMPHOCYTES NFR BLD: 30.6 % (ref 18–48)
MCH RBC QN AUTO: 29.1 PG (ref 27–31)
MCHC RBC AUTO-ENTMCNC: 33.2 G/DL (ref 32–36)
MCV RBC AUTO: 88 FL (ref 82–98)
MONOCYTES # BLD AUTO: 0.3 K/UL (ref 0.3–1)
MONOCYTES NFR BLD: 5.7 % (ref 4–15)
NEUTROPHILS # BLD AUTO: 3.1 K/UL (ref 1.8–7.7)
NEUTROPHILS NFR BLD: 61.9 % (ref 38–73)
NITRITE UR QL STRIP: NEGATIVE
NRBC BLD-RTO: 0 /100 WBC
PH UR STRIP: 8 [PH] (ref 5–8)
PLATELET # BLD AUTO: 257 K/UL (ref 150–450)
PMV BLD AUTO: 10.1 FL (ref 9.2–12.9)
POCT GLUCOSE: 161 MG/DL (ref 70–110)
POTASSIUM SERPL-SCNC: 3.4 MMOL/L (ref 3.5–5.1)
PROT SERPL-MCNC: 6.9 G/DL (ref 6–8.4)
PROT UR QL STRIP: NEGATIVE
RBC # BLD AUTO: 4.12 M/UL (ref 4–5.4)
SODIUM SERPL-SCNC: 138 MMOL/L (ref 136–145)
SP GR UR STRIP: 1.02 (ref 1–1.03)
URN SPEC COLLECT METH UR: NORMAL
UROBILINOGEN UR STRIP-ACNC: NEGATIVE EU/DL
WBC # BLD AUTO: 5.06 K/UL (ref 3.9–12.7)

## 2024-11-29 PROCEDURE — 96374 THER/PROPH/DIAG INJ IV PUSH: CPT

## 2024-11-29 PROCEDURE — 63600175 PHARM REV CODE 636 W HCPCS

## 2024-11-29 PROCEDURE — 85025 COMPLETE CBC W/AUTO DIFF WBC: CPT

## 2024-11-29 PROCEDURE — 93005 ELECTROCARDIOGRAM TRACING: CPT

## 2024-11-29 PROCEDURE — 81003 URINALYSIS AUTO W/O SCOPE: CPT

## 2024-11-29 PROCEDURE — 80053 COMPREHEN METABOLIC PANEL: CPT

## 2024-11-29 PROCEDURE — 81025 URINE PREGNANCY TEST: CPT

## 2024-11-29 PROCEDURE — 82962 GLUCOSE BLOOD TEST: CPT

## 2024-11-29 PROCEDURE — 99284 EMERGENCY DEPT VISIT MOD MDM: CPT | Mod: 25

## 2024-11-29 PROCEDURE — 93010 ELECTROCARDIOGRAM REPORT: CPT | Mod: ,,, | Performed by: GENERAL PRACTICE

## 2024-11-29 PROCEDURE — 36415 COLL VENOUS BLD VENIPUNCTURE: CPT

## 2024-11-29 RX ORDER — BUTALBITAL, ACETAMINOPHEN AND CAFFEINE 50; 325; 40 MG/1; MG/1; MG/1
1 TABLET ORAL EVERY 4 HOURS PRN
Qty: 20 TABLET | Refills: 0 | Status: SHIPPED | OUTPATIENT
Start: 2024-11-29

## 2024-11-29 RX ORDER — KETOROLAC TROMETHAMINE 30 MG/ML
15 INJECTION, SOLUTION INTRAMUSCULAR; INTRAVENOUS
Status: COMPLETED | OUTPATIENT
Start: 2024-11-29 | End: 2024-11-29

## 2024-11-29 RX ADMIN — KETOROLAC TROMETHAMINE 15 MG: 30 INJECTION, SOLUTION INTRAMUSCULAR; INTRAVENOUS at 10:11

## 2024-11-29 NOTE — ED PROVIDER NOTES
Encounter Date: 11/29/2024       History     Chief Complaint   Patient presents with    Hypertension    Headache     X 2 days      44-year-old female with a past medical history of diabetes, hypertension, and migraines presents to ED for hypertension and headache.  Patient states she has had intermittent headache since last week.  Patient states she took her blood pressure yesterday and it was 183/107.  Patient has been compliant with her metoprolol.  Patient denies any increase or decrease in doses lately.  Patient also having left-sided intermittent headache that she rates 8/10.  No medications prior to arrival.  Denies anything making it worse.  Patient states she has a history of migraines and this is her baseline.  Patient denies any recent head trauma or falls.  Patient denies fever, sweats, chills, visual disturbances, shortness breath, chest pain, leg swelling, abdominal pain, nausea, vomiting, diarrhea, constipation, urinary symptoms, dizziness, lightheadedness numbness, tingling, facial drooping, confusion, weakness, and speech difficulty.      Review of patient's allergies indicates:  No Known Allergies  Past Medical History:   Diagnosis Date    Diabetes mellitus     Hypertension     Migraine headache      Past Surgical History:   Procedure Laterality Date    TUBAL LIGATION       Family History   Problem Relation Name Age of Onset    Hypertension Mother      Hypertension Father      Diabetes Father       Social History     Tobacco Use    Smoking status: Never    Smokeless tobacco: Never   Substance Use Topics    Alcohol use: Yes     Comment: occ    Drug use: No     Review of Systems   Constitutional:  Negative for chills, diaphoresis and fever.   Eyes:  Negative for photophobia and visual disturbance.   Respiratory:  Negative for shortness of breath.    Cardiovascular:  Negative for chest pain and leg swelling.   Gastrointestinal:  Negative for abdominal pain, constipation, diarrhea, nausea and vomiting.    Genitourinary:  Negative for decreased urine volume, difficulty urinating, dysuria, frequency, hematuria and urgency.   Musculoskeletal:  Negative for myalgias.   Neurological:  Positive for headaches. Negative for dizziness, syncope, facial asymmetry, speech difficulty, weakness, light-headedness and numbness.   Psychiatric/Behavioral:  Negative for confusion.        Physical Exam     Initial Vitals [11/29/24 0902]   BP Pulse Resp Temp SpO2   (!) 184/101 90 20 97 °F (36.1 °C) 99 %      MAP       --         Physical Exam    Nursing note and vitals reviewed.  Constitutional: She appears well-developed and well-nourished. She is not diaphoretic. She is active. She does not appear ill. No distress.   HENT:   Head: Normocephalic and atraumatic.   Right Ear: External ear normal.   Left Ear: External ear normal.   Nose: Nose normal.   Eyes: Conjunctivae, EOM and lids are normal. Pupils are equal, round, and reactive to light. Right eye exhibits no discharge. Left eye exhibits no discharge. Right eye exhibits normal extraocular motion and no nystagmus. Left eye exhibits normal extraocular motion and no nystagmus.   Neck: Phonation normal. Neck supple.   Normal range of motion.   Full passive range of motion without pain.     Cardiovascular:  Normal rate and regular rhythm.           Pulmonary/Chest: Effort normal and breath sounds normal. No respiratory distress.   Abdominal: She exhibits no distension.   Musculoskeletal:         General: Normal range of motion.      Cervical back: Full passive range of motion without pain, normal range of motion and neck supple.      Right lower leg: Normal. No swelling. No edema.      Left lower leg: Normal. No swelling. No edema.     Neurological: She is alert and oriented to person, place, and time. She has normal strength. No cranial nerve deficit or sensory deficit. GCS eye subscore is 4. GCS verbal subscore is 5. GCS motor subscore is 6.   Skin: Skin is dry. Capillary refill takes  less than 2 seconds.         ED Course   Procedures  Labs Reviewed   CBC W/ AUTO DIFFERENTIAL - Abnormal       Result Value    WBC 5.06      RBC 4.12      Hemoglobin 12.0      Hematocrit 36.1 (*)     MCV 88      MCH 29.1      MCHC 33.2      RDW 12.6      Platelets 257      MPV 10.1      Immature Granulocytes 0.2      Gran # (ANC) 3.1      Immature Grans (Abs) 0.01      Lymph # 1.6      Mono # 0.3      Eos # 0.1      Baso # 0.02      nRBC 0      Gran % 61.9      Lymph % 30.6      Mono % 5.7      Eosinophil % 1.2      Basophil % 0.4      Differential Method Automated     COMPREHENSIVE METABOLIC PANEL - Abnormal    Sodium 138      Potassium 3.4 (*)     Chloride 102      CO2 26      Glucose 166 (*)     BUN 8      Creatinine 0.8      Calcium 8.8      Total Protein 6.9      Albumin 3.4 (*)     Total Bilirubin 0.5      Alkaline Phosphatase 102      AST 12      ALT 18      eGFR >60      Anion Gap 10     POCT GLUCOSE - Abnormal    POCT Glucose 161 (*)    URINALYSIS, REFLEX TO URINE CULTURE    Specimen UA Urine, Clean Catch      Color, UA Yellow      Appearance, UA Clear      pH, UA 8.0      Specific Gravity, UA 1.020      Protein, UA Negative      Glucose, UA Negative      Ketones, UA Negative      Bilirubin (UA) Negative      Occult Blood UA Negative      Nitrite, UA Negative      Urobilinogen, UA Negative      Leukocytes, UA Negative      Narrative:     Specimen Source->Urine   POCT URINE PREGNANCY    POC Preg Test, Ur Negative       Acceptable Yes     POCT GLUCOSE MONITORING CONTINUOUS     EKG Readings: (Independently Interpreted)   EKG showed normal sinus rhythm with 74 bpm. [place] interval 140 ms. QRS 78 ms.  ms. No stemi noted.  Normal EKG.  Signed by Dr. Dave.        Imaging Results    None          Medications   ketorolac injection 15 mg (15 mg Intravenous Given 11/29/24 1027)     Medical Decision Making  44-year-old female with a past medical history of diabetes, hypertension, and migraines  presents to ED for hypertension and headache.   Patient's chart and medical history reviewed.    Ddx:  HTN  HTN urgency  Migraine  Tension headache  NORMA  Anemia  Hyperglycemia     Patient's vitals reviewed.  Afebrile, no respiratory distress, and nontoxic-appearing in the ED. patient's physical exam was unremarkable.  Patient's neuro exam was normal.  UPT was negative.  Patient given Toradol for pain.  Point of care glucose is 161. EKG showed normal sinus rhythm with 74 bpm. [place] interval 140 ms. QRS 78 ms.  ms. No stemi noted.  Normal EKG.  Signed by Dr. Dave. CBC unremarkable.  CMP showed elevated glucose of 166 with mild hypokalemia of 3.4. UA unremarkable.  Discussed all results with patient including no overall acute findings, she verbalized understanding.  Considered but unlikely CVA, TIA, SAH, meningitis, and trigeminal neuralgia after neuro exam being normal, no fevers, no visual disturbances, no neck pain, no eye pain, no head trauma, or worst headache of life.  Patient be sent home with Fioricet as needed for headaches.  Patient will follow-up with her PCP about her hypertension.  Patient currently 163/87, unlikely hypertension urgency or emergency at this time.  No evidence of end-organ damage. Discussed with patient to rest and stay well hydrated, she verbalized understanding.  Patient agrees with this plan. Discussed with her strict return precautions, she verbalized understanding. Patient is stable for discharge.     Amount and/or Complexity of Data Reviewed  External Data Reviewed: labs, radiology, ECG and notes.  Labs: ordered.  ECG/medicine tests: ordered.    Risk  Prescription drug management.                                      Clinical Impression:  Final diagnoses:  [I10] Hypertension (Primary)  [G43.009] Migraine without aura and without status migrainosus, not intractable          ED Disposition Condition    Discharge Stable          ED Prescriptions       Medication Sig Dispense Start  Date End Date Auth. Provider    butalbital-acetaminophen-caffeine -40 mg (FIORICET, ESGIC) -40 mg per tablet Take 1 tablet by mouth every 4 (four) hours as needed for Headaches. 20 tablet 11/29/2024 -- Holdsworth, Alayna, PA-C          Follow-up Information       Follow up With Specialties Details Why Contact Info    Hector Hackett MD Family Medicine Schedule an appointment as soon as possible for a visit   [address] [facility] [address] [phone]               Holdsworth, Alayna, PA-C  11/29/24 4813

## 2024-11-29 NOTE — Clinical Note
"Nila Funez" Wisam was seen and treated in our emergency department on 11/29/2024.  She may return to work on 12/02/2024.       If you have any questions or concerns, please don't hesitate to call.       RN    "

## 2024-11-29 NOTE — DISCHARGE INSTRUCTIONS

## 2024-12-02 LAB
OHS QRS DURATION: 78 MS
OHS QTC CALCULATION: 435 MS

## 2025-03-26 ENCOUNTER — HOSPITAL ENCOUNTER (EMERGENCY)
Facility: HOSPITAL | Age: 45
Discharge: HOME | End: 2025-03-26
Attending: EMERGENCY MEDICINE
Payer: COMMERCIAL

## 2025-03-26 VITALS
SYSTOLIC BLOOD PRESSURE: 137 MMHG | OXYGEN SATURATION: 97 % | DIASTOLIC BLOOD PRESSURE: 84 MMHG | WEIGHT: 221 LBS | HEART RATE: 84 BPM | HEIGHT: 67 IN | TEMPERATURE: 98 F | RESPIRATION RATE: 16 BRPM | BODY MASS INDEX: 34.69 KG/M2

## 2025-03-26 DIAGNOSIS — R73.9 HYPERGLYCEMIA WITHOUT KETOSIS: ICD-10-CM

## 2025-03-26 DIAGNOSIS — G56.02 CARPAL TUNNEL SYNDROME OF LEFT WRIST: Primary | ICD-10-CM

## 2025-03-26 DIAGNOSIS — M79.603 ARM PAIN: ICD-10-CM

## 2025-03-26 DIAGNOSIS — Z91.148 NONCOMPLIANCE WITH MEDICATION REGIMEN: ICD-10-CM

## 2025-03-26 DIAGNOSIS — M25.532 ACUTE PAIN OF LEFT WRIST: ICD-10-CM

## 2025-03-26 LAB
ABSOLUTE EOSINOPHIL (OHS): 0.06 K/UL
ABSOLUTE MONOCYTE (OHS): 0.32 K/UL (ref 0.3–1)
ABSOLUTE NEUTROPHIL COUNT (OHS): 3.46 K/UL (ref 1.8–7.7)
ALBUMIN SERPL BCP-MCNC: 3.7 G/DL (ref 3.5–5.2)
ALLENS TEST: ABNORMAL
ALP SERPL-CCNC: 134 UNIT/L (ref 40–150)
ALT SERPL W/O P-5'-P-CCNC: 16 UNIT/L (ref 10–44)
ANION GAP (OHS): 9 MMOL/L (ref 8–16)
AST SERPL-CCNC: 15 UNIT/L (ref 11–45)
B-HCG UR QL: NEGATIVE
B-OH-BUTYR BLD STRIP-SCNC: 0.1 MMOL/L
BACTERIA #/AREA URNS AUTO: NORMAL /HPF
BASOPHILS # BLD AUTO: 0.01 K/UL
BASOPHILS NFR BLD AUTO: 0.2 %
BILIRUB SERPL-MCNC: 0.3 MG/DL (ref 0.1–1)
BILIRUB UR QL STRIP.AUTO: NEGATIVE
BUN SERPL-MCNC: 6 MG/DL (ref 6–20)
CALCIUM SERPL-MCNC: 9 MG/DL (ref 8.7–10.5)
CHLORIDE SERPL-SCNC: 100 MMOL/L (ref 95–110)
CLARITY UR: CLEAR
CO2 SERPL-SCNC: 27 MMOL/L (ref 23–29)
COLOR UR AUTO: COLORLESS
CREAT SERPL-MCNC: 0.8 MG/DL (ref 0.5–1.4)
DELSYS: ABNORMAL
ERYTHROCYTE [DISTWIDTH] IN BLOOD BY AUTOMATED COUNT: 12.4 % (ref 11.5–14.5)
GFR SERPLBLD CREATININE-BSD FMLA CKD-EPI: >60 ML/MIN/1.73/M2
GLUCOSE SERPL-MCNC: 327 MG/DL (ref 70–110)
GLUCOSE UR QL STRIP: ABNORMAL
HCO3 UR-SCNC: 34.4 MMOL/L (ref 24–28)
HCT VFR BLD AUTO: 40.5 % (ref 37–48.5)
HCT VFR BLD CALC: 39 %PCV (ref 36–54)
HCV AB SERPL QL IA: NORMAL
HGB BLD-MCNC: 13.3 GM/DL (ref 12–16)
HGB UR QL STRIP: NEGATIVE
HIV 1+2 AB+HIV1 P24 AG SERPL QL IA: NORMAL
IMM GRANULOCYTES # BLD AUTO: 0.02 K/UL (ref 0–0.04)
IMM GRANULOCYTES NFR BLD AUTO: 0.3 % (ref 0–0.5)
KETONES UR QL STRIP: NEGATIVE
LEUKOCYTE ESTERASE UR QL STRIP: NEGATIVE
LYMPHOCYTES # BLD AUTO: 1.91 K/UL (ref 1–4.8)
MCH RBC QN AUTO: 29.6 PG (ref 27–50)
MCHC RBC AUTO-ENTMCNC: 32.8 G/DL (ref 32–36)
MCV RBC AUTO: 90 FL (ref 82–98)
MICROSCOPIC COMMENT: NORMAL
NITRITE UR QL STRIP: NEGATIVE
NUCLEATED RBC (/100WBC) (OHS): 0 /100 WBC
OHS QRS DURATION: 90 MS
OHS QTC CALCULATION: 455 MS
PCO2 BLDA: 61.1 MMHG (ref 35–45)
PH SMN: 7.36 [PH] (ref 7.35–7.45)
PH UR STRIP: 6 [PH]
PLATELET # BLD AUTO: 289 K/UL (ref 150–450)
PMV BLD AUTO: 10.4 FL (ref 9.2–12.9)
PO2 BLDA: 17 MMHG (ref 40–60)
POC BE: 9 MMOL/L
POC IONIZED CALCIUM: 1.16 MMOL/L (ref 1.06–1.42)
POC SATURATED O2: 22 % (ref 95–100)
POC TCO2: 36 MMOL/L (ref 24–29)
POCT GLUCOSE: 398 MG/DL (ref 70–110)
POTASSIUM BLD-SCNC: 5.3 MMOL/L (ref 3.5–5.1)
POTASSIUM SERPL-SCNC: 3.3 MMOL/L (ref 3.5–5.1)
PROT SERPL-MCNC: 7.7 GM/DL (ref 6–8.4)
PROT UR QL STRIP: NEGATIVE
RBC # BLD AUTO: 4.5 M/UL (ref 4–5.4)
RBC #/AREA URNS AUTO: 1 /HPF (ref 0–4)
RELATIVE EOSINOPHIL (OHS): 1 %
RELATIVE LYMPHOCYTE (OHS): 33 % (ref 18–48)
RELATIVE MONOCYTE (OHS): 5.5 % (ref 4–15)
RELATIVE NEUTROPHIL (OHS): 60 % (ref 38–73)
SAMPLE: ABNORMAL
SITE: ABNORMAL
SODIUM BLD-SCNC: 134 MMOL/L (ref 136–145)
SODIUM SERPL-SCNC: 136 MMOL/L (ref 136–145)
SP GR UR STRIP: 1.01
UROBILINOGEN UR STRIP-ACNC: NEGATIVE EU/DL
WBC # BLD AUTO: 5.78 K/UL (ref 3.9–12.7)
WBC #/AREA URNS AUTO: 4 /HPF (ref 0–5)
YEAST UR QL AUTO: NORMAL /HPF

## 2025-03-26 PROCEDURE — 87389 HIV-1 AG W/HIV-1&-2 AB AG IA: CPT | Performed by: PHYSICIAN ASSISTANT

## 2025-03-26 PROCEDURE — 93010 ELECTROCARDIOGRAM REPORT: CPT | Mod: ,,, | Performed by: INTERNAL MEDICINE

## 2025-03-26 PROCEDURE — 25000003 PHARM REV CODE 250: Performed by: EMERGENCY MEDICINE

## 2025-03-26 PROCEDURE — 86803 HEPATITIS C AB TEST: CPT | Performed by: PHYSICIAN ASSISTANT

## 2025-03-26 PROCEDURE — 93005 ELECTROCARDIOGRAM TRACING: CPT

## 2025-03-26 PROCEDURE — 99284 EMERGENCY DEPT VISIT MOD MDM: CPT | Mod: 25

## 2025-03-26 PROCEDURE — 82010 KETONE BODYS QUAN: CPT | Performed by: EMERGENCY MEDICINE

## 2025-03-26 PROCEDURE — 81003 URINALYSIS AUTO W/O SCOPE: CPT | Performed by: EMERGENCY MEDICINE

## 2025-03-26 PROCEDURE — 96360 HYDRATION IV INFUSION INIT: CPT

## 2025-03-26 PROCEDURE — 82962 GLUCOSE BLOOD TEST: CPT

## 2025-03-26 PROCEDURE — 99900035 HC TECH TIME PER 15 MIN (STAT)

## 2025-03-26 PROCEDURE — 80053 COMPREHEN METABOLIC PANEL: CPT | Performed by: EMERGENCY MEDICINE

## 2025-03-26 PROCEDURE — 82803 BLOOD GASES ANY COMBINATION: CPT

## 2025-03-26 PROCEDURE — 81025 URINE PREGNANCY TEST: CPT | Performed by: EMERGENCY MEDICINE

## 2025-03-26 PROCEDURE — 85025 COMPLETE CBC W/AUTO DIFF WBC: CPT | Performed by: EMERGENCY MEDICINE

## 2025-03-26 RX ORDER — METOPROLOL SUCCINATE 50 MG/1
50 TABLET, EXTENDED RELEASE ORAL DAILY
Qty: 30 TABLET | Refills: 0 | Status: SHIPPED | OUTPATIENT
Start: 2025-03-26 | End: 2026-03-26

## 2025-03-26 RX ORDER — HYDROCODONE BITARTRATE AND ACETAMINOPHEN 5; 325 MG/1; MG/1
1 TABLET ORAL EVERY 4 HOURS PRN
Qty: 12 TABLET | Refills: 0 | Status: SHIPPED | OUTPATIENT
Start: 2025-03-26 | End: 2025-04-07

## 2025-03-26 RX ORDER — METFORMIN HYDROCHLORIDE 500 MG/1
500 TABLET ORAL 2 TIMES DAILY WITH MEALS
Qty: 60 TABLET | Refills: 0 | Status: SHIPPED | OUTPATIENT
Start: 2025-03-26 | End: 2025-04-25

## 2025-03-26 RX ORDER — NAPROXEN 500 MG/1
500 TABLET ORAL 2 TIMES DAILY WITH MEALS
Qty: 20 TABLET | Refills: 0 | Status: SHIPPED | OUTPATIENT
Start: 2025-03-26

## 2025-03-26 RX ADMIN — SODIUM CHLORIDE 2000 ML: 9 INJECTION, SOLUTION INTRAVENOUS at 01:03

## 2025-03-26 NOTE — Clinical Note
"Nila Rousejeane Joel was seen and treated in our emergency department on 3/26/2025.  She may return to work on 03/27/2025.       If you have any questions or concerns, please don't hesitate to call.      Wilmer Bernardo MD"

## 2025-03-26 NOTE — ED TRIAGE NOTES
Nila Joel, a 45 y.o. female presents to the ED w/ complaint of left arm pain/tingling off and of since Saturday, denies trauma    Triage note:  Chief Complaint   Patient presents with    Arm Pain     L arm pain for few days, denies injury , tingling to r arm constant for few day     Review of patient's allergies indicates:  No Known Allergies        APPEARANCE: awake and alert in NAD. PAIN  5/10  SKIN: warm, dry and intact. No breakdown or bruising.  MUSCULOSKELETAL: Patient moving all extremities spontaneously, no obvious swelling or deformities noted. Ambulates independently.  RESPIRATORY: Denies shortness of breath.Respirations unlabored.   CARDIAC: Denies CP, 2+ distal pulses; no peripheral edema  ABDOMEN: S/ND/NT, Denies nausea  : voids spontaneously, denies difficulty  Neurologic: AAO x 4; follows commands equal strength in all extremities; denies numbness/tingling. Denies dizziness

## 2025-03-27 NOTE — ED PROVIDER NOTES
Encounter Date: 3/26/2025       History     Chief Complaint   Patient presents with    Arm Pain     L arm pain for few days, denies injury , tingling to r arm constant for few day     The history is provided by the patient.   Arm Injury   The incident occurred several days ago. Incident location: Patient does not recall any specific injury. She came to the ER via by private vehicle. There is an injury to the Right upper arm, right forearm, right wrist and right hand. There have been no prior injuries to these areas. She has received no recent medical care. Associated symptoms include tingling. Pertinent negatives include no chest pain, no altered mental status, no visual disturbance, no abdominal pain, no nausea, no vomiting, no neck pain, no focal weakness, no decreased responsiveness, no seizures, no weakness and no cough.     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    Patient states she is unable to see her primary care physician has been out of her metformin and blood pressure medicines for months.    Review of patient's allergies indicates:  No Known Allergies  Past Medical History:   Diagnosis Date    Diabetes mellitus     Hypertension     Migraine headache      Past Surgical History:   Procedure Laterality Date    TUBAL LIGATION       Family History   Problem Relation Name Age of Onset    Hypertension Mother      Hypertension Father      Diabetes Father       Social History[1]  Review of Systems   Constitutional:  Negative for appetite change, chills, decreased responsiveness and fever.   HENT:  Negative for sore throat.    Eyes:  Negative for visual disturbance.   Respiratory:  Negative for cough and shortness of breath.    Cardiovascular:  Negative for chest pain.   Gastrointestinal:  Negative for abdominal pain, nausea and vomiting.   Genitourinary:   "Negative for dysuria.   Musculoskeletal:  Positive for arthralgias and myalgias. Negative for back pain, joint swelling and neck pain.   Skin:  Negative for color change and rash.   Neurological:  Positive for tingling. Negative for focal weakness, seizures, facial asymmetry and weakness.   Hematological:  Does not bruise/bleed easily.   All other systems reviewed and are negative.      Physical Exam     Initial Vitals [03/26/25 1112]   BP Pulse Resp Temp SpO2   (!) 183/96 98 20 98.5 °F (36.9 °C) 99 %      MAP       --         Physical Exam    Nursing note and vitals reviewed.  Constitutional: She appears well-developed and well-nourished. No distress.   HENT:   Head: Normocephalic and atraumatic.   Eyes: EOM are normal. Pupils are equal, round, and reactive to light.   Neck: Neck supple.   Normal range of motion.  Cardiovascular:  Normal rate, regular rhythm, normal heart sounds and intact distal pulses.           Pulmonary/Chest: Breath sounds normal. No stridor. She has no wheezes. She has no rhonchi.   Abdominal: Abdomen is soft. Bowel sounds are normal. She exhibits no mass. There is no rebound and no guarding.   Musculoskeletal:         General: No edema. Normal range of motion.      Right upper arm: Normal.      Left upper arm: Normal. No swelling or bony tenderness.      Left forearm: No swelling or bony tenderness.      Cervical back: Normal range of motion and neck supple.      Comments: Left arm is diffusely "sore" no bony point tender to palpation.  No erythema or signs infection or trauma.  She is neurovascularly intact distally feel some tingling in her left 2nd 3rd and 4th finger     Neurological: She is alert and oriented to person, place, and time. She has normal strength. No sensory deficit.   Skin: Skin is warm and dry. No rash noted.   Psychiatric: She has a normal mood and affect. Her behavior is normal. Judgment and thought content normal.         ED Course   Procedures  Labs Reviewed "   COMPREHENSIVE METABOLIC PANEL - Abnormal       Result Value    Sodium 136      Potassium 3.3 (*)     Chloride 100      CO2 27      Glucose 327 (*)     BUN 6      Creatinine 0.8      Calcium 9.0      Protein Total 7.7      Albumin 3.7      Bilirubin Total 0.3            AST 15      ALT 16      Anion Gap 9      eGFR >60     URINALYSIS, REFLEX TO URINE CULTURE - Abnormal    Color, UA Colorless (*)     Appearance, UA Clear      pH, UA 6.0      Spec Grav UA 1.010      Protein, UA Negative      Glucose, UA 4+ (*)     Ketones, UA Negative      Bilirubin, UA Negative      Blood, UA Negative      Nitrites, UA Negative      Urobilinogen, UA Negative      Leukocyte Esterase, UA Negative     POCT GLUCOSE - Abnormal    POCT Glucose 398 (*)    ISTAT PROCEDURE - Abnormal    POC PH 7.359      POC PCO2 61.1 (*)     POC PO2 17 (*)     POC HCO3 34.4 (*)     POC BE 9 (*)     POC SATURATED O2 22      POC Sodium 134 (*)     POC Potassium 5.3 (*)     POC TCO2 36 (*)     POC Ionized Calcium 1.16      POC Hematocrit 39      Sample VENOUS      Site Other      Allens Test N/A      DelSys Room Air     HEPATITIS C ANTIBODY - Normal    Hep C Ab Interp Non-Reactive     HIV 1 / 2 ANTIBODY - Normal    HIV 1/2 Ag/Ab Non-Reactive     BETA - HYDROXYBUTYRATE, SERUM - Normal    Beta-Hydroxybutyrate 0.1     PREGNANCY TEST, URINE RAPID - Normal    hCG Qualitative, Urine Negative     CBC WITH DIFFERENTIAL - Normal    WBC 5.78      RBC 4.50      HGB 13.3      HCT 40.5      MCV 90      MCH 29.6      MCHC 32.8      RDW 12.4      Platelet Count 289      MPV 10.4      Nucleated RBC 0      Neut % 60.0      Lymph % 33.0      Mono % 5.5      Eos % 1.0      Basophil % 0.2      Imm Grans % 0.3      Neut # 3.46      Lymph # 1.91      Mono # 0.32      Eos # 0.06      Baso # 0.01      Imm Grans # 0.02     CBC W/ AUTO DIFFERENTIAL    Narrative:     The following orders were created for panel order CBC auto differential.  Procedure                                Abnormality         Status                     ---------                               -----------         ------                     CBC with Differential[[id number]]       Normal              Final result                 Please view results for these tests on the individual orders.   URINALYSIS MICROSCOPIC    RBC, UA 1      WBC, UA 4      Bacteria, UA Rare      Yeast, UA None      Microscopic Comment            ECG Results              EKG 12-lead (Final result)        Collection Time Result Time QRS Duration OHS QTC Calculation    03/26/25 11:18:28 03/26/25 14:52:00 90 455                     Final result by Interface, Lab In [facility] (03/26/25 14:52:09)                   Narrative:    Test Reason : M79.603,    Vent. Rate :  93 BPM     Atrial Rate :  93 BPM     P-R Int : 140 ms          QRS Dur :  90 ms      QT Int : 366 ms       P-R-T Axes :  46  -6   7 degrees    QTcB Int : 455 ms    Normal sinus rhythm  Moderate voltage criteria for LVH, may be normal variant ( R in aVL ,  [place] product )  Nonspecific T wave abnormality  Abnormal ECG  When compared with ECG of 29-Nov-2024 09:18,  Questionable change in The axis  Nonspecific T wave abnormality now evident in Inferior leads  Nonspecific T wave abnormality now evident in Lateral leads  Confirmed by Jacques Bae ([id number]) on 3/26/2025 2:51:55 PM    Referred By:            Confirmed By: Jacques Bae                                  Imaging Results    None          Medications   sodium chloride 0.9% bolus 2,000 mL 2,000 mL (0 mLs Intravenous Stopped 3/26/25 1440)     Medical Decision Making  Problems Addressed:  Acute pain of left wrist: acute illness or injury that poses a threat to life or bodily functions  Arm pain: acute illness or injury that poses a threat to life or bodily functions  Hyperglycemia without ketosis: acute illness or injury that poses a threat to life or bodily functions  Noncompliance with medication regimen: chronic illness or injury with  exacerbation, progression, or side effects of treatment    Amount and/or Complexity of Data Reviewed  External Data Reviewed: labs.     Details: Chemistry was relatively normal on November 29, 2024 with a glucose minimally elevated at 166  Labs: ordered. Decision-making details documented in ED Course.    Risk  Prescription drug management.  Risk Details: Differential diagnosis includes noncompliance, DKA, hyperglycemia without ketosis, DVT, carpal tunnel syndrome, nerve impingement, stroke, muscle strain, neuropathy, etc.    I discussed the length the differential diagnosis with the patient.  We will restart her medications and she will follow up primary care physician for recheck and possible referral for further evaluation including possible nerve conduction studies.  She will resume her diabetic diet.  Patient will return emergency department immediately for any worsening signs or symptoms.               ED Course as of 03/27/25 1802   Wed Mar 26, 2025   1356 POC PH: 7.359 [ANALILIA]   1356 POC HCO3(!): 34.4 [ANALILIA]   1356 Sodium: 136 [ANALILIA]   1356 Glucose(!): 327 [ANALILIA]   1356 BUN: 6 [ANALILIA]   1356 Creatinine: 0.8 [ANALILIA]   1356 Anion Gap: 9 [ANALILIA]   1356 WBC: 5.78 [ANALILIA]   1356 hCG Qualitative, Urine: Negative [ANALILIA]   1356 POCT Glucose(!): 398 [ANALILIA]      ED Course User Index  [ANALILIA] Wilmer Bernardo MD                           Clinical Impression:  Final diagnoses:  [M79.603] Arm pain  [M25.532] Acute pain of left wrist  [R73.9] Hyperglycemia without ketosis  [Z91.148] Noncompliance with medication regimen  [G56.02] Carpal tunnel syndrome of left wrist (Primary)          ED Disposition Condition    Discharge Stable          ED Prescriptions       Medication Sig Dispense Start Date End Date Auth. Provider    metoprolol succinate (TOPROL-XL) 50 MG 24 hr tablet Take 1 tablet (50 mg total) by mouth once daily. 30 tablet 3/26/2025 3/26/2026 Wilmer Bernardo MD    metFORMIN (GLUCOPHAGE) 500 MG tablet Take 1 tablet (500 mg total) by mouth 2 (two)  times daily with meals. 60 tablet 3/26/2025 4/25/2025 Wilmer Bernardo MD    HYDROcodone-acetaminophen (NORCO) 5-325 mg per tablet Take 1 tablet by mouth every 4 (four) hours as needed for Pain. 12 tablet 3/26/2025 4/7/2025 Wilmer Bernardo MD    naproxen (NAPROSYN) 500 MG tablet Take 1 tablet (500 mg total) by mouth 2 (two) times daily with meals. 20 tablet 3/26/2025 -- Wilmer Bernardo MD          Follow-up Information       Follow up With Specialties Details Why Contact Info    Hector Hackett MD Family Medicine Schedule an appointment as soon as possible for a visit in 3 days  [address] [facility] [address] [phone]                 [1]   Social History  Tobacco Use    Smoking status: Never    Smokeless tobacco: Never   Substance Use Topics    Alcohol use: Yes     Comment: occ    Drug use: No        Wilmer Bernardo MD  03/27/25 2041

## 2025-04-04 PROBLEM — E11.65 TYPE 2 DIABETES MELLITUS WITH HYPERGLYCEMIA, WITHOUT LONG-TERM CURRENT USE OF INSULIN: Status: ACTIVE | Noted: 2025-04-04

## 2025-04-04 PROBLEM — E11.69 HYPERLIPIDEMIA ASSOCIATED WITH TYPE 2 DIABETES MELLITUS: Status: ACTIVE | Noted: 2025-04-04

## 2025-04-04 PROBLEM — E11.59 HYPERTENSION ASSOCIATED WITH DIABETES: Status: ACTIVE | Noted: 2025-04-04

## 2025-04-04 PROBLEM — I15.2 HYPERTENSION ASSOCIATED WITH DIABETES: Status: ACTIVE | Noted: 2025-04-04

## 2025-04-04 PROBLEM — E78.5 HYPERLIPIDEMIA ASSOCIATED WITH TYPE 2 DIABETES MELLITUS: Status: ACTIVE | Noted: 2025-04-04

## 2025-04-07 ENCOUNTER — OFFICE VISIT (OUTPATIENT)
Dept: FAMILY MEDICINE | Facility: CLINIC | Age: 45
End: 2025-04-07
Payer: COMMERCIAL

## 2025-04-07 VITALS
SYSTOLIC BLOOD PRESSURE: 120 MMHG | BODY MASS INDEX: 34.67 KG/M2 | TEMPERATURE: 98 F | HEART RATE: 106 BPM | DIASTOLIC BLOOD PRESSURE: 80 MMHG | HEIGHT: 67 IN | WEIGHT: 220.88 LBS | OXYGEN SATURATION: 96 %

## 2025-04-07 DIAGNOSIS — E11.59 HYPERTENSION ASSOCIATED WITH DIABETES: ICD-10-CM

## 2025-04-07 DIAGNOSIS — Z12.11 COLON CANCER SCREENING: ICD-10-CM

## 2025-04-07 DIAGNOSIS — E11.69 HYPERLIPIDEMIA ASSOCIATED WITH TYPE 2 DIABETES MELLITUS: ICD-10-CM

## 2025-04-07 DIAGNOSIS — Z76.89 ESTABLISHING CARE WITH NEW DOCTOR, ENCOUNTER FOR: Primary | ICD-10-CM

## 2025-04-07 DIAGNOSIS — E66.811 CLASS 1 OBESITY DUE TO EXCESS CALORIES WITH SERIOUS COMORBIDITY AND BODY MASS INDEX (BMI) OF 34.0 TO 34.9 IN ADULT: ICD-10-CM

## 2025-04-07 DIAGNOSIS — Z12.4 CERVICAL CANCER SCREENING: ICD-10-CM

## 2025-04-07 DIAGNOSIS — Z12.31 ENCOUNTER FOR SCREENING MAMMOGRAM FOR MALIGNANT NEOPLASM OF BREAST: ICD-10-CM

## 2025-04-07 DIAGNOSIS — I15.2 HYPERTENSION ASSOCIATED WITH DIABETES: ICD-10-CM

## 2025-04-07 DIAGNOSIS — E66.09 CLASS 1 OBESITY DUE TO EXCESS CALORIES WITH SERIOUS COMORBIDITY AND BODY MASS INDEX (BMI) OF 34.0 TO 34.9 IN ADULT: ICD-10-CM

## 2025-04-07 DIAGNOSIS — E78.5 HYPERLIPIDEMIA ASSOCIATED WITH TYPE 2 DIABETES MELLITUS: ICD-10-CM

## 2025-04-07 DIAGNOSIS — E11.65 TYPE 2 DIABETES MELLITUS WITH HYPERGLYCEMIA, WITHOUT LONG-TERM CURRENT USE OF INSULIN: ICD-10-CM

## 2025-04-07 DIAGNOSIS — H53.8 BLURRED VISION, BILATERAL: ICD-10-CM

## 2025-04-07 LAB — HBA1C MFR BLD: 12.1 %

## 2025-04-07 PROCEDURE — 99999 PR PBB SHADOW E&M-EST. PATIENT-LVL V: CPT | Mod: PBBFAC,,, | Performed by: STUDENT IN AN ORGANIZED HEALTH CARE EDUCATION/TRAINING PROGRAM

## 2025-04-07 RX ORDER — TIRZEPATIDE 2.5 MG/.5ML
2.5 INJECTION, SOLUTION SUBCUTANEOUS
Qty: 2 ML | Refills: 2 | Status: SHIPPED | OUTPATIENT
Start: 2025-04-07 | End: 2025-07-06

## 2025-04-07 NOTE — ASSESSMENT & PLAN NOTE
Chronic problem stable based on  in-clinic vitals. Continue current regimen including metoprolol.

## 2025-04-07 NOTE — PROGRESS NOTES
Subjective      Nila Joel is a 45 y.o. female        Chief Complaint   Patient presents with    [facility] Care        HPI     Patient presents today to [facility] care.      Problem Noted   Class 1 Obesity Due to Excess Calories With Serious Comorbidity and Body Mass Index (Bmi) of 34.0 to 34.9 in Adult 4/7/2025    Patient with elevated BMI. The patient denies regular exercise and dietary modifications. Last recorded BMI listed below.        Body mass index is 34.6 kg/m².       Hypertension Associated With Diabetes 4/4/2025    Patient with Hypertension. The patient does not routinely check their blood pressure at home and reports compliance with metoprolol.     Wt Readings from Last 3 Encounters:   04/07/25 100.2 kg (220 lb 14.4 oz)   03/26/25 100.2 kg (221 lb)   11/29/24 102.5 kg (226 lb)     Temp Readings from Last 3 Encounters:   04/07/25 98.2 °F (36.8 °C) (Oral)   03/26/25 98.1 °F (36.7 °C) (Oral)   11/29/24 97 °F (36.1 °C) (Oral)     BP Readings from Last 3 Encounters:   04/07/25 120/80   03/26/25 137/84   11/29/24 (!) 156/72     Pulse Readings from Last 3 Encounters:   04/07/25 106   03/26/25 84   11/29/24 82          Type 2 Diabetes Mellitus With Hyperglycemia, Without Long-Term Current Use of Insulin 4/4/2025    Patient with diabetes,  which is currently complicated by hyperglycemia. The patient reports compliance with metformin, which he recently restarted from the ER.  She has also previously been on Ozempic but has not taken this for several months.             Hyperlipidemia Associated With Type 2 Diabetes Mellitus 4/4/2025    Patient with hyperlipidemia in the setting of diabetes.  She is not currently on any medications for this concern          ALLERGIES  Patient has no known allergies.     MEDICATIONS  Encounter Medications[1]     PAST MEDICAL HISTORY  Past Medical History:   Diagnosis Date    Diabetes mellitus     Diabetes mellitus, type 2     Hypertension     Migraine headache         PAST  "SURGIAL HISTORY  Past Surgical History:   Procedure Laterality Date    TUBAL LIGATION          FAMILY HISTORY  Family History   Problem Relation Name Age of Onset    Hypertension Mother      Hypertension Father      Diabetes Father          PAST SOCIAL HISTORY  Social History     Socioeconomic History    Marital status: Single   Tobacco Use    Smoking status: Never    Smokeless tobacco: Never   Substance and Sexual Activity    Alcohol use: Yes     Comment: occ    Drug use: No    Sexual activity: Yes     Partners: Male        Health Maintenance   Topic Date Due    Cervical Cancer Screening  Never done    Lipid Panel  Never done    TETANUS VACCINE  04/18/2024    Influenza Vaccine (1) 09/01/2024    COVID-19 Vaccine (4 - 2024-25 season) 09/01/2024    Mammogram  01/31/2025    Colorectal Cancer Screening  Never done    RSV Vaccine (Age 60+ and Pregnant patients) (1 - 1-dose 75+ series) 02/09/2055    Hepatitis C Screening  Completed    HIV Screening  Completed    Pneumococcal Vaccines (Age 0-49)  Aged Out           ROS        Objective   Vitals:    04/07/25 1504   BP: 120/80   BP Location: Left arm   Patient Position: Sitting   Pulse: 106   Temp: 98.2 °F (36.8 °C)   TempSrc: Oral   SpO2: 96%   Weight: 100.2 kg (220 lb 14.4 oz)   Height: 5' 7" (1.702 m)       Body mass index is 34.6 kg/m².       Physical Exam  Constitutional:       General: She is not in acute distress.     Appearance: She is obese. She is not ill-appearing.   HENT:      Head: Normocephalic and atraumatic.   Eyes:      Extraocular Movements: Extraocular movements intact.      Pupils: Pupils are equal, round, and reactive to light.   Cardiovascular:      Rate and Rhythm: Normal rate and regular rhythm.   Pulmonary:      Effort: Pulmonary effort is normal.      Breath sounds: Normal breath sounds. No wheezing, rhonchi or rales.   Musculoskeletal:         General: No swelling or tenderness. Normal range of motion.      Cervical back: Normal range of motion. "   Skin:     General: Skin is warm and dry.   Neurological:      General: No focal deficit present.      Mental Status: She is alert and oriented to person, place, and time.   Psychiatric:         Mood and Affect: Mood normal.         Thought Content: Thought content normal.          Establishing care with new doctor, encounter for    Hypertension associated with diabetes  Assessment & Plan:  Chronic problem stable based on  in-clinic vitals. Continue current regimen including metoprolol.       Type 2 diabetes mellitus with hyperglycemia, without long-term current use of insulin  Assessment & Plan:  Chronic problem with progression not well controlled  based on POC A1c which was obtained today.  Will assess with updated lab work including microalbumin. Continue current regimen including metformin.  Start Mounjaro.  Return to clinic in 4 weeks       Orders:  -     Hemoglobin A1C, POCT  -     Microalbumin/Creatinine Ratio, Urine; Future; Expected date: 04/07/2025  -     Ambulatory referral/consult to Ophthalmology; Future; Expected date: 04/14/2025  -     tirzepatide (MOUNJARO) 2.5 mg/0.5 mL PnIj; Inject 2.5 mg into the skin every 7 days.  Dispense: 2 mL; Refill: 2    Hyperlipidemia associated with type 2 diabetes mellitus  Assessment & Plan:  Chronic problem unsure of stability.  Will assess with updated blood work including lipid panel.    Orders:  -     Lipid Panel; Future; Expected date: 04/07/2025    Class 1 obesity due to excess calories with serious comorbidity and body mass index (BMI) of 34.0 to 34.9 in adult  Assessment & Plan:  Chronic problem with progression not well controlled  based on last BMI. Discussed importance of lifestyle modifications including diet and exercise. Exercise goal of 30 minutes per day for 5 days for total of 150 minutes per week. Handout given on diabetes and  diet.         Blurred vision, bilateral  Comments:  Chronic problem with progression.  Not well controlled per patient.  We  will place referral to Ophthalmology  Orders:  -     Ambulatory referral/consult to Ophthalmology; Future; Expected date: 04/14/2025    Cervical cancer screening  -     Ambulatory referral/consult to Obstetrics / Gynecology; Future; Expected date: 04/14/2025    Encounter for screening mammogram for malignant neoplasm of breast  -     Mammo Digital Screening Bilat w/ Eliecer (XPD); Future; Expected date: 04/07/2025    Colon cancer screening  -     Cologuard Screening (Multitarget Stool DNA); Future; Expected date: 04/07/2025                   Bryson Enrique             Portions of this note were created using voice recognition software. There may be voice recognition errors found in the text, and attempts were made to correct these errors prior to signature.              [1]   Outpatient Encounter Medications as of 4/7/2025   Medication Sig Dispense Refill    metFORMIN (GLUCOPHAGE) 500 MG tablet Take 1 tablet (500 mg total) by mouth 2 (two) times daily with meals. 60 tablet 0    metoprolol succinate (TOPROL-XL) 50 MG 24 hr tablet Take 1 tablet (50 mg total) by mouth once daily. 30 tablet 0    tirzepatide (MOUNJARO) 2.5 mg/0.5 mL PnIj Inject 2.5 mg into the skin every 7 days. 2 mL 2    [DISCONTINUED] (Magic mouthwash) 1:1:1 Benadryl 12.5mg/5ml liq, aluminum & magnesium hydroxide-simehticone (Maalox), lidocaine viscous 2% Swish and spit 5 mLs every 4 (four) hours as needed. 90 mL 0    [DISCONTINUED] butalbital-acetaminophen-caffeine -40 mg (FIORICET, ESGIC) -40 mg per tablet Take 1 tablet by mouth every 4 (four) hours as needed for Headaches. 20 tablet 0    [DISCONTINUED] fluticasone propionate (FLONASE) 50 mcg/actuation nasal spray SPRAY 1 SPRAY INTO EACH NOSTRIL EVERY DAY 16 mL 0    [DISCONTINUED] glipiZIDE 5 MG TR24 Take 1 tablet (5 mg total) by mouth once daily. 30 tablet 3    [DISCONTINUED] HYDROcodone-acetaminophen (NORCO) 5-325 mg per tablet Take 1 tablet by mouth every 4 (four) hours as needed for  Pain. 12 tablet 0    [DISCONTINUED] loratadine (CLARITIN) 10 mg tablet TAKE 1 TABLET BY MOUTH DAILY 30 tablet 1    [DISCONTINUED] methocarbamol (ROBAXIN) 500 MG Tab Take 2 tablets (1,000 mg total) by mouth 3 (three) times daily as needed (Muscle Spasm/Pain). 18 tablet 0    [DISCONTINUED] naproxen (NAPROSYN) 500 MG tablet Take 1 tablet (500 mg total) by mouth 2 (two) times daily with meals. 20 tablet 0    [DISCONTINUED] ondansetron (ZOFRAN) 4 MG tablet Take 4 mg by mouth every 12 (twelve) hours as needed.  2    [DISCONTINUED] rizatriptan (MAXALT) 10 MG tablet Take 10 mg by mouth every 12 (twelve) hours as needed.  0    [DISCONTINUED] topiramate (TOPAMAX) 25 MG tablet Take 25 mg by mouth 2 (two) times daily.      [DISCONTINUED] valACYclovir (VALTREX) 1000 MG tablet Take 1 tablet (1,000 mg total) by mouth 3 (three) times daily. for 7 days 21 tablet 1     No facility-administered encounter medications on file as of 4/7/2025.

## 2025-04-07 NOTE — ASSESSMENT & PLAN NOTE
Chronic problem with progression not well controlled  based on last BMI. Discussed importance of lifestyle modifications including diet and exercise. Exercise goal of 30 minutes per day for 5 days for total of 150 minutes per week. Handout given on diabetes and  diet.

## 2025-04-07 NOTE — ASSESSMENT & PLAN NOTE
Chronic problem with progression not well controlled  based on POC A1c which was obtained today.  Will assess with updated lab work including microalbumin. Continue current regimen including metformin.  Start Mounjaro.  Return to clinic in 4 weeks

## 2025-04-14 ENCOUNTER — TELEPHONE (OUTPATIENT)
Dept: FAMILY MEDICINE | Facility: CLINIC | Age: 45
End: 2025-04-14
Payer: COMMERCIAL

## 2025-04-29 ENCOUNTER — RESULTS FOLLOW-UP (OUTPATIENT)
Dept: FAMILY MEDICINE | Facility: CLINIC | Age: 45
End: 2025-04-29

## 2025-04-29 DIAGNOSIS — E11.65 TYPE 2 DIABETES MELLITUS WITH HYPERGLYCEMIA, WITHOUT LONG-TERM CURRENT USE OF INSULIN: Primary | ICD-10-CM

## 2025-05-09 ENCOUNTER — LAB VISIT (OUTPATIENT)
Dept: LAB | Facility: HOSPITAL | Age: 45
End: 2025-05-09
Attending: STUDENT IN AN ORGANIZED HEALTH CARE EDUCATION/TRAINING PROGRAM
Payer: COMMERCIAL

## 2025-05-09 ENCOUNTER — OFFICE VISIT (OUTPATIENT)
Dept: FAMILY MEDICINE | Facility: CLINIC | Age: 45
End: 2025-05-09
Payer: COMMERCIAL

## 2025-05-09 VITALS
BODY MASS INDEX: 35.39 KG/M2 | HEART RATE: 80 BPM | OXYGEN SATURATION: 96 % | SYSTOLIC BLOOD PRESSURE: 140 MMHG | DIASTOLIC BLOOD PRESSURE: 100 MMHG | WEIGHT: 225.5 LBS | TEMPERATURE: 98 F | HEIGHT: 67 IN

## 2025-05-09 DIAGNOSIS — I15.2 HYPERTENSION ASSOCIATED WITH DIABETES: ICD-10-CM

## 2025-05-09 DIAGNOSIS — E78.5 HYPERLIPIDEMIA ASSOCIATED WITH TYPE 2 DIABETES MELLITUS: ICD-10-CM

## 2025-05-09 DIAGNOSIS — E11.59 HYPERTENSION ASSOCIATED WITH DIABETES: ICD-10-CM

## 2025-05-09 DIAGNOSIS — E11.65 TYPE 2 DIABETES MELLITUS WITH HYPERGLYCEMIA, WITHOUT LONG-TERM CURRENT USE OF INSULIN: Primary | ICD-10-CM

## 2025-05-09 DIAGNOSIS — E11.65 TYPE 2 DIABETES MELLITUS WITH HYPERGLYCEMIA, WITHOUT LONG-TERM CURRENT USE OF INSULIN: ICD-10-CM

## 2025-05-09 DIAGNOSIS — E11.69 HYPERLIPIDEMIA ASSOCIATED WITH TYPE 2 DIABETES MELLITUS: ICD-10-CM

## 2025-05-09 LAB
ALBUMIN SERPL BCP-MCNC: 3.3 G/DL (ref 3.5–5.2)
ALBUMIN/CREAT UR: 47.5 UG/MG
ANION GAP (OHS): 8 MMOL/L (ref 8–16)
BUN SERPL-MCNC: 6 MG/DL (ref 6–20)
CALCIUM SERPL-MCNC: 8.7 MG/DL (ref 8.7–10.5)
CHLORIDE SERPL-SCNC: 103 MMOL/L (ref 95–110)
CHOLEST SERPL-MCNC: 169 MG/DL (ref 120–199)
CHOLEST/HDLC SERPL: 3.4 {RATIO} (ref 2–5)
CO2 SERPL-SCNC: 28 MMOL/L (ref 23–29)
CREAT SERPL-MCNC: 0.7 MG/DL (ref 0.5–1.4)
CREAT UR-MCNC: 158 MG/DL (ref 15–325)
GFR SERPLBLD CREATININE-BSD FMLA CKD-EPI: >60 ML/MIN/1.73/M2
GLUCOSE SERPL-MCNC: 136 MG/DL (ref 70–110)
HDLC SERPL-MCNC: 50 MG/DL (ref 40–75)
HDLC SERPL: 29.6 % (ref 20–50)
LDLC SERPL CALC-MCNC: 104.6 MG/DL (ref 63–159)
MICROALBUMIN UR-MCNC: 75 UG/ML (ref ?–5000)
NONHDLC SERPL-MCNC: 119 MG/DL
PHOSPHATE SERPL-MCNC: 3.1 MG/DL (ref 2.7–4.5)
POTASSIUM SERPL-SCNC: 3.8 MMOL/L (ref 3.5–5.1)
SODIUM SERPL-SCNC: 139 MMOL/L (ref 136–145)
TRIGL SERPL-MCNC: 72 MG/DL (ref 30–150)

## 2025-05-09 PROCEDURE — 36415 COLL VENOUS BLD VENIPUNCTURE: CPT | Mod: PN

## 2025-05-09 PROCEDURE — 80069 RENAL FUNCTION PANEL: CPT

## 2025-05-09 PROCEDURE — 99999 PR PBB SHADOW E&M-EST. PATIENT-LVL III: CPT | Mod: PBBFAC,,, | Performed by: STUDENT IN AN ORGANIZED HEALTH CARE EDUCATION/TRAINING PROGRAM

## 2025-05-09 PROCEDURE — 80061 LIPID PANEL: CPT

## 2025-05-09 RX ORDER — METOPROLOL SUCCINATE 50 MG/1
50 TABLET, EXTENDED RELEASE ORAL DAILY
Qty: 90 TABLET | Refills: 3 | Status: SHIPPED | OUTPATIENT
Start: 2025-05-09 | End: 2026-05-09

## 2025-05-09 RX ORDER — METFORMIN HYDROCHLORIDE 1000 MG/1
1000 TABLET ORAL 2 TIMES DAILY WITH MEALS
Qty: 180 TABLET | Refills: 3 | Status: SHIPPED | OUTPATIENT
Start: 2025-05-09 | End: 2026-05-04

## 2025-05-09 NOTE — PROGRESS NOTES
Subjective      Nila Joel is a 45 y.o. female        Chief Complaint   Patient presents with    Diabetes        HPI     Patient presents today for Chronic Care Management follow up.         Problem Noted   Class 1 Obesity Due to Excess Calories With Serious Comorbidity and Body Mass Index (Bmi) of 34.0 to 34.9 in Adult 4/7/2025    Patient with elevated BMI. The patient denies regular exercise and dietary modifications. Last recorded BMI listed below.        Body mass index is 34.6 kg/m².       Hypertension Associated With Diabetes 4/4/2025    Patient with Hypertension. The patient does not routinely check their blood pressure at home and unfortunately has been her metoprolol.    Wt Readings from Last 3 Encounters:   05/09/25 102.3 kg (225 lb 8.5 oz)   04/07/25 100.2 kg (220 lb 14.4 oz)   03/26/25 100.2 kg (221 lb)     Temp Readings from Last 3 Encounters:   05/09/25 98.4 °F (36.9 °C) (Oral)   04/07/25 98.2 °F (36.8 °C) (Oral)   03/26/25 98.1 °F (36.7 °C) (Oral)     BP Readings from Last 3 Encounters:   05/09/25 (!) 140/100   04/07/25 120/80   03/26/25 137/84     Pulse Readings from Last 3 Encounters:   05/09/25 80   04/07/25 106   03/26/25 84          Type 2 Diabetes Mellitus With Hyperglycemia, Without Long-Term Current Use of Insulin 4/4/2025    Patient with diabetes,  which is currently complicated by hyperglycemia. The patient reports compliance with Mounjaro, which he denies any side effects.  She unfortunately has also ran out of her metformin.  Last POC A1c elevated at 12.1%.         Hyperlipidemia Associated With Type 2 Diabetes Mellitus 4/4/2025    Patient with hyperlipidemia in the setting of diabetes.  She is not currently on any medications for this concern                  ALLERGIES  Patient has no known allergies.     MEDICATIONS  Encounter Medications[1]     PAST MEDICAL HISTORY  Past Medical History:   Diagnosis Date    Diabetes mellitus     Diabetes mellitus, type 2     Hypertension      "Migraine headache         PAST SURGIAL HISTORY  Past Surgical History:   Procedure Laterality Date    TUBAL LIGATION          FAMILY HISTORY  Family History   Problem Relation Name Age of Onset    Hypertension Mother      Hypertension Father      Diabetes Father          PAST SOCIAL HISTORY  Social History     Socioeconomic History    Marital status: Single   Tobacco Use    Smoking status: Never    Smokeless tobacco: Never   Substance and Sexual Activity    Alcohol use: Yes     Comment: occ    Drug use: No    Sexual activity: Yes     Partners: Male        Health Maintenance   Topic Date Due    Cervical Cancer Screening  Never done    Lipid Panel  Never done    Diabetes Urine Screening  Never done    Foot Exam  Never done    Diabetic Eye Exam  Never done    Pneumococcal Vaccines (Age 0-49) (1 of 2 - PCV) Never done    Low Dose Statin  Never done    TETANUS VACCINE  04/18/2024    COVID-19 Vaccine (4 - 2024-25 season) 09/01/2024    Mammogram  01/31/2025    Hemoglobin A1c  07/07/2025    Influenza Vaccine (Season Ended) 09/01/2025    Colorectal Cancer Screening  04/22/2028    RSV Vaccine (Age 60+ and Pregnant patients) (1 - 1-dose 75+ series) 02/09/2055    Hepatitis C Screening  Completed    HIV Screening  Completed           ROS        Objective   Vitals:    05/09/25 1033   BP: (!) 140/100   BP Location: Right arm   Patient Position: Sitting   Pulse: 80   Temp: 98.4 °F (36.9 °C)   TempSrc: Oral   SpO2: 96%   Weight: 102.3 kg (225 lb 8.5 oz)   Height: 5' 7" (1.702 m)       Body mass index is 35.32 kg/m².       Physical Exam  Constitutional:       General: She is not in acute distress.     Appearance: She is obese. She is not ill-appearing.   HENT:      Head: Normocephalic and atraumatic.   Eyes:      Extraocular Movements: Extraocular movements intact.      Pupils: Pupils are equal, round, and reactive to light.   Cardiovascular:      Rate and Rhythm: Normal rate and regular rhythm.   Pulmonary:      Effort: Pulmonary " effort is normal.      Breath sounds: Normal breath sounds. No wheezing, rhonchi or rales.   Musculoskeletal:         General: No swelling or tenderness. Normal range of motion.      Cervical back: Normal range of motion.   Skin:     General: Skin is warm and dry.   Neurological:      General: No focal deficit present.      Mental Status: She is alert and oriented to person, place, and time.   Psychiatric:         Mood and Affect: Mood normal.         Thought Content: Thought content normal.          Type 2 diabetes mellitus with hyperglycemia, without long-term current use of insulin  Assessment & Plan:  Chronic problem with progression.  Not well controlled based on last A1c.  We will obtain updated renal function panel.  Continue metformin and Mounjaro, await lab results for further titration    Orders:  -     metFORMIN (GLUCOPHAGE) 1000 MG tablet; Take 1 tablet (1,000 mg total) by mouth 2 (two) times daily with meals.  Dispense: 180 tablet; Refill: 3  -     Cancel: Microalbumin/Creatinine Ratio, Urine  -     Renal Function Panel; Future; Expected date: 05/09/2025  -     Diabetes Digital Medicine (DDMP) Enrollment Order  -     Microalbumin/Creatinine Ratio, Urine    Hypertension associated with diabetes  Assessment & Plan:  Chronic problem with progression.  Not well controlled based on vitals.  Restart metoprolol.  Return to clinic in 4 weeks for blood pressure visit    Orders:  -     metoprolol succinate (TOPROL-XL) 50 MG 24 hr tablet; Take 1 tablet (50 mg total) by mouth once daily.  Dispense: 90 tablet; Refill: 3  -     Hypertension Digital Medicine (HDMP) Enrollment Order            Bryson Enrique             Portions of this note were created using voice recognition software. There may be voice recognition errors found in the text, and attempts were made to correct these errors prior to signature.        [1]   Outpatient Encounter Medications as of 5/9/2025   Medication Sig Dispense Refill    tirzepatide  (MOUNJARO) 2.5 mg/0.5 mL PnIj Inject 2.5 mg into the skin every 7 days. 2 mL 2    [DISCONTINUED] metoprolol succinate (TOPROL-XL) 50 MG 24 hr tablet Take 1 tablet (50 mg total) by mouth once daily. 30 tablet 0    metFORMIN (GLUCOPHAGE) 1000 MG tablet Take 1 tablet (1,000 mg total) by mouth 2 (two) times daily with meals. 180 tablet 3    metoprolol succinate (TOPROL-XL) 50 MG 24 hr tablet Take 1 tablet (50 mg total) by mouth once daily. 90 tablet 3    [DISCONTINUED] metFORMIN (GLUCOPHAGE) 500 MG tablet Take 1 tablet (500 mg total) by mouth 2 (two) times daily with meals. (Patient not taking: Reported on 5/9/2025) 60 tablet 0     No facility-administered encounter medications on file as of 5/9/2025.

## 2025-05-09 NOTE — ASSESSMENT & PLAN NOTE
Chronic problem with progression.  Not well controlled based on last A1c.  We will obtain updated renal function panel.  Continue metformin and Mounjaro, await lab results for further titration  
Chronic problem with progression.  Not well controlled based on vitals.  Restart metoprolol.  Return to clinic in 4 weeks for blood pressure visit  
12-Jun-2019 00:31

## 2025-05-12 RX ORDER — TIRZEPATIDE 5 MG/.5ML
5 INJECTION, SOLUTION SUBCUTANEOUS
Qty: 2 ML | Refills: 2 | Status: SHIPPED | OUTPATIENT
Start: 2025-05-12 | End: 2025-08-10

## 2025-05-15 ENCOUNTER — OFFICE VISIT (OUTPATIENT)
Dept: FAMILY MEDICINE | Facility: CLINIC | Age: 45
End: 2025-05-15
Payer: COMMERCIAL

## 2025-05-15 VITALS
BODY MASS INDEX: 35.16 KG/M2 | OXYGEN SATURATION: 96 % | HEART RATE: 106 BPM | HEIGHT: 67 IN | SYSTOLIC BLOOD PRESSURE: 136 MMHG | TEMPERATURE: 99 F | WEIGHT: 224 LBS | DIASTOLIC BLOOD PRESSURE: 80 MMHG

## 2025-05-15 DIAGNOSIS — R09.81 NASAL CONGESTION: ICD-10-CM

## 2025-05-15 DIAGNOSIS — R05.9 COUGH IN ADULT: ICD-10-CM

## 2025-05-15 DIAGNOSIS — U07.1 COVID-19: Primary | ICD-10-CM

## 2025-05-15 DIAGNOSIS — U07.1 COVID-19 VIRUS DETECTED: ICD-10-CM

## 2025-05-15 DIAGNOSIS — R50.9 FEVER IN ADULT: ICD-10-CM

## 2025-05-15 LAB
CTP QC/QA: YES
MOLECULAR STREP A: NEGATIVE
POC MOLECULAR INFLUENZA A AGN: NEGATIVE
POC MOLECULAR INFLUENZA B AGN: NEGATIVE
SARS-COV-2 RDRP RESP QL NAA+PROBE: POSITIVE

## 2025-05-15 PROCEDURE — 99999 PR PBB SHADOW E&M-EST. PATIENT-LVL III: CPT | Mod: PBBFAC,,, | Performed by: STUDENT IN AN ORGANIZED HEALTH CARE EDUCATION/TRAINING PROGRAM

## 2025-05-15 PROCEDURE — 87651 STREP A DNA AMP PROBE: CPT | Mod: QW,S$GLB,, | Performed by: STUDENT IN AN ORGANIZED HEALTH CARE EDUCATION/TRAINING PROGRAM

## 2025-05-15 PROCEDURE — 87502 INFLUENZA DNA AMP PROBE: CPT | Mod: QW,S$GLB,, | Performed by: STUDENT IN AN ORGANIZED HEALTH CARE EDUCATION/TRAINING PROGRAM

## 2025-05-15 RX ORDER — NIRMATRELVIR AND RITONAVIR 300-100 MG
KIT ORAL
Qty: 30 TABLET | Refills: 0 | Status: SHIPPED | OUTPATIENT
Start: 2025-05-15 | End: 2025-05-20

## 2025-05-15 NOTE — LETTER
May 15, 2025      [facility]  [address]  [address]  [facility] [address]  Phone: [phone]  Fax: [phone]       Patient: Nila Joel   YOB: 1980  Date of Visit: 05/15/2025    To Whom It May Concern:    Ale Joel  was at [facility] on 05/15/2025. The patient may return to work/school on 2/19/25 as long as she is afebrile. She will need to be masked for a total of 10 days following initial symptoms, which will be 2/22/25. If you have any questions or concerns, or if I can be of further assistance, please do not hesitate to contact me.    Sincerely,    Bryson Enrique MD

## 2025-05-15 NOTE — PROGRESS NOTES
Subjective      Nila Joel is a 45 y.o. female        Chief Complaint   Patient presents with    Cough     congestion    Headache        HPI     Patient presents today for acute care visit.      Patient with 3 day history of cough, nasal congestion, and fever.  She denies any shortness of breath or chest pain.  She is unsure of any sick contacts however she has been around a lot of people over the last weekend.          ALLERGIES  Patient has no known allergies.     MEDICATIONS  Encounter Medications[1]     PAST MEDICAL HISTORY  Past Medical History:   Diagnosis Date    Diabetes mellitus     Diabetes mellitus, type 2     Hypertension     Migraine headache         PAST SURGIAL HISTORY  Past Surgical History:   Procedure Laterality Date    TUBAL LIGATION          FAMILY HISTORY  Family History   Problem Relation Name Age of Onset    Hypertension Mother      Hypertension Father      Diabetes Father          PAST SOCIAL HISTORY  Social History     Socioeconomic History    Marital status: Single   Tobacco Use    Smoking status: Never    Smokeless tobacco: Never   Substance and Sexual Activity    Alcohol use: Yes     Comment: occ    Drug use: No    Sexual activity: Yes     Partners: Male        Health Maintenance   Topic Date Due    Cervical Cancer Screening  Never done    Foot Exam  Never done    Diabetic Eye Exam  Never done    Pneumococcal Vaccines (Age 0-49) (1 of 2 - PCV) Never done    Low Dose Statin  Never done    TETANUS VACCINE  04/18/2024    COVID-19 Vaccine (4 - 2024-25 season) 09/01/2024    Mammogram  01/31/2025    Hemoglobin A1c  07/07/2025    Influenza Vaccine (Season Ended) 09/01/2025    Diabetes Urine Screening  05/09/2026    Lipid Panel  05/09/2026    Colorectal Cancer Screening  04/22/2028    RSV Vaccine (Age 60+ and Pregnant patients) (1 - 1-dose 75+ series) 02/09/2055    Hepatitis C Screening  Completed    HIV Screening  Completed           ROS        Objective   Vitals:    05/15/25 0930  "  BP: 136/80   BP Location: Right arm   Patient Position: Sitting   Pulse: 106   Temp: 98.8 °F (37.1 °C)   TempSrc: Oral   SpO2: 96%   Weight: 101.6 kg (224 lb)   Height: 5' 7" (1.702 m)       Body mass index is 35.08 kg/m².       Physical Exam  Constitutional:       General: She is not in acute distress.     Appearance: She is obese. She is ill-appearing.   HENT:      Head: Normocephalic and atraumatic.      Nose: Congestion present.      Mouth/Throat:      Pharynx: Oropharyngeal exudate present.   Eyes:      Extraocular Movements: Extraocular movements intact.      Pupils: Pupils are equal, round, and reactive to light.   Cardiovascular:      Rate and Rhythm: Normal rate and regular rhythm.   Pulmonary:      Effort: Pulmonary effort is normal.      Breath sounds: Normal breath sounds. No wheezing, rhonchi or rales.   Musculoskeletal:         General: No swelling or tenderness. Normal range of motion.      Cervical back: Normal range of motion.   Skin:     General: Skin is warm and dry.   Neurological:      General: No focal deficit present.      Mental Status: She is alert and oriented to person, place, and time.   Psychiatric:         Mood and Affect: Mood normal.         Thought Content: Thought content normal.         Recent Results (from the past 24 hours)   POCT Influenza A/B Molecular    Collection Time: 05/15/25  9:56 AM   Result Value Ref Range    POC Molecular Influenza A Ag Negative Negative    POC Molecular Influenza B Ag Negative Negative     Acceptable Yes    POCT Strep A, Molecular    Collection Time: 05/15/25  9:57 AM   Result Value Ref Range    Molecular Strep A, POC Negative Negative     Acceptable Yes    POCT COVID-19 Rapid Screening    Collection Time: 05/15/25  9:58 AM   Result Value Ref Range    POC Rapid COVID Positive (A) Negative     Acceptable Yes          COVID-19  Comments:  Positive COVID testing today.  Given risk factors including diabetes, " recommend Paxlovid.  Handout given on COVID  Orders:  -     nirmatrelvir-ritonavir (PAXLOVID) 300 mg (150 mg x 2)-100 mg copackaged tablets (EUA); Take 3 tablets by mouth 2 (two) times daily. Each dose contains 2 nirmatrelvir (pink tablets) and 1 ritonavir (white tablet). Take all 3 tablets together  Dispense: 30 tablet; Refill: 0    Nasal congestion  Comments:  Acute problem.  Not well controlled per patient.  Flu, COVID, and strep testing performed today.  Positive for COVID, see for additional plan  Orders:  -     POCT COVID-19 Rapid Screening; Future  -     POCT Influenza A/B Molecular  -     POCT Strep A, Molecular    Cough in adult  Comments:  Acute problem. Not well controlled per patient. Flu, COVID, and strep testing performed today. Positive for COVID, see for additional plan  Orders:  -     POCT COVID-19 Rapid Screening; Future  -     POCT Influenza A/B Molecular  -     POCT Strep A, Molecular    Fever in adult  Comments:  Systemic sign of current COVID, see for additional plan  Orders:  -     POCT COVID-19 Rapid Screening; Future  -     POCT Influenza A/B Molecular  -     POCT Strep A, Molecular           Bryson Enrique             Portions of this note were created using voice recognition software. There may be voice recognition errors found in the text, and attempts were made to correct these errors prior to signature.        [1]   Outpatient Encounter Medications as of 5/15/2025   Medication Sig Dispense Refill    metFORMIN (GLUCOPHAGE) 1000 MG tablet Take 1 tablet (1,000 mg total) by mouth 2 (two) times daily with meals. 180 tablet 3    metoprolol succinate (TOPROL-XL) 50 MG 24 hr tablet Take 1 tablet (50 mg total) by mouth once daily. 90 tablet 3    tirzepatide (MOUNJARO) 5 mg/0.5 mL PnIj Inject 5 mg into the skin every 7 days. 2 mL 2    nirmatrelvir-ritonavir (PAXLOVID) 300 mg (150 mg x 2)-100 mg copackaged tablets (EUA) Take 3 tablets by mouth 2 (two) times daily. Each dose contains 2 nirmatrelvir  (pink tablets) and 1 ritonavir (white tablet). Take all 3 tablets together 30 tablet 0     No facility-administered encounter medications on file as of 5/15/2025.

## 2025-05-15 NOTE — LETTER
May 15, 2025      [facility]  [address]  [address]  [place] [address]  Phone: [phone]  Fax: [phone]       Patient: Nila Joel   YOB: 1980  Date of Visit: 05/15/2025    To Whom It May Concern:    Ale Joel  was at [facility] on 05/15/2025. The patient may return to work/school on 5/19/25 as long as she is afebrile. She will need to be masked for a total of 10 days following initial symptoms, which will be 5/22/25. If you have any questions or concerns, or if I can be of further assistance, please do not hesitate to contact me.    Sincerely,    Bryson Enrique MD

## 2025-05-23 ENCOUNTER — PATIENT MESSAGE (OUTPATIENT)
Dept: ADMINISTRATIVE | Facility: HOSPITAL | Age: 45
End: 2025-05-23
Payer: COMMERCIAL

## 2025-05-29 ENCOUNTER — PATIENT MESSAGE (OUTPATIENT)
Dept: ADMINISTRATIVE | Facility: HOSPITAL | Age: 45
End: 2025-05-29
Payer: COMMERCIAL

## 2025-09-02 ENCOUNTER — TELEPHONE (OUTPATIENT)
Dept: FAMILY MEDICINE | Facility: CLINIC | Age: 45
End: 2025-09-02

## 2025-09-02 ENCOUNTER — LAB VISIT (OUTPATIENT)
Dept: LAB | Facility: HOSPITAL | Age: 45
End: 2025-09-02
Attending: STUDENT IN AN ORGANIZED HEALTH CARE EDUCATION/TRAINING PROGRAM
Payer: COMMERCIAL

## 2025-09-02 ENCOUNTER — OFFICE VISIT (OUTPATIENT)
Dept: FAMILY MEDICINE | Facility: CLINIC | Age: 45
End: 2025-09-02
Payer: COMMERCIAL

## 2025-09-02 VITALS
BODY MASS INDEX: 36.72 KG/M2 | SYSTOLIC BLOOD PRESSURE: 148 MMHG | HEART RATE: 87 BPM | DIASTOLIC BLOOD PRESSURE: 90 MMHG | TEMPERATURE: 98 F | HEIGHT: 67 IN | OXYGEN SATURATION: 94 % | WEIGHT: 233.94 LBS

## 2025-09-02 DIAGNOSIS — E11.65 TYPE 2 DIABETES MELLITUS WITH HYPERGLYCEMIA, WITHOUT LONG-TERM CURRENT USE OF INSULIN: Primary | ICD-10-CM

## 2025-09-02 DIAGNOSIS — E11.59 HYPERTENSION ASSOCIATED WITH DIABETES: ICD-10-CM

## 2025-09-02 DIAGNOSIS — E11.65 TYPE 2 DIABETES MELLITUS WITH HYPERGLYCEMIA, WITHOUT LONG-TERM CURRENT USE OF INSULIN: ICD-10-CM

## 2025-09-02 DIAGNOSIS — I15.2 HYPERTENSION ASSOCIATED WITH DIABETES: ICD-10-CM

## 2025-09-02 DIAGNOSIS — Z12.4 CERVICAL CANCER SCREENING: ICD-10-CM

## 2025-09-02 LAB
ALBUMIN SERPL BCP-MCNC: 3.6 G/DL (ref 3.5–5.2)
ALP SERPL-CCNC: 90 UNIT/L (ref 40–150)
ALT SERPL W/O P-5'-P-CCNC: 13 UNIT/L (ref 0–55)
ANION GAP (OHS): 11 MMOL/L (ref 8–16)
AST SERPL-CCNC: 16 UNIT/L (ref 0–50)
BILIRUB SERPL-MCNC: 0.4 MG/DL (ref 0.1–1)
BUN SERPL-MCNC: 6 MG/DL (ref 6–20)
CALCIUM SERPL-MCNC: 8.8 MG/DL (ref 8.7–10.5)
CHLORIDE SERPL-SCNC: 100 MMOL/L (ref 95–110)
CO2 SERPL-SCNC: 27 MMOL/L (ref 23–29)
CREAT SERPL-MCNC: 0.6 MG/DL (ref 0.5–1.4)
GFR SERPLBLD CREATININE-BSD FMLA CKD-EPI: >60 ML/MIN/1.73/M2
GLUCOSE SERPL-MCNC: 218 MG/DL (ref 70–110)
HBA1C MFR BLD: 6.5 %
POTASSIUM SERPL-SCNC: 3.6 MMOL/L (ref 3.5–5.1)
PROT SERPL-MCNC: 6.9 GM/DL (ref 6–8.4)
SODIUM SERPL-SCNC: 138 MMOL/L (ref 136–145)

## 2025-09-02 PROCEDURE — 99214 OFFICE O/P EST MOD 30 MIN: CPT | Mod: S$GLB,,, | Performed by: STUDENT IN AN ORGANIZED HEALTH CARE EDUCATION/TRAINING PROGRAM

## 2025-09-02 PROCEDURE — 3008F BODY MASS INDEX DOCD: CPT | Mod: CPTII,S$GLB,, | Performed by: STUDENT IN AN ORGANIZED HEALTH CARE EDUCATION/TRAINING PROGRAM

## 2025-09-02 PROCEDURE — 1159F MED LIST DOCD IN RCRD: CPT | Mod: CPTII,S$GLB,, | Performed by: STUDENT IN AN ORGANIZED HEALTH CARE EDUCATION/TRAINING PROGRAM

## 2025-09-02 PROCEDURE — 36415 COLL VENOUS BLD VENIPUNCTURE: CPT | Mod: PN

## 2025-09-02 PROCEDURE — 3066F NEPHROPATHY DOC TX: CPT | Mod: CPTII,S$GLB,, | Performed by: STUDENT IN AN ORGANIZED HEALTH CARE EDUCATION/TRAINING PROGRAM

## 2025-09-02 PROCEDURE — 1160F RVW MEDS BY RX/DR IN RCRD: CPT | Mod: CPTII,S$GLB,, | Performed by: STUDENT IN AN ORGANIZED HEALTH CARE EDUCATION/TRAINING PROGRAM

## 2025-09-02 PROCEDURE — 3060F POS MICROALBUMINURIA REV: CPT | Mod: CPTII,S$GLB,, | Performed by: STUDENT IN AN ORGANIZED HEALTH CARE EDUCATION/TRAINING PROGRAM

## 2025-09-02 PROCEDURE — 99999 PR PBB SHADOW E&M-EST. PATIENT-LVL III: CPT | Mod: PBBFAC,,, | Performed by: STUDENT IN AN ORGANIZED HEALTH CARE EDUCATION/TRAINING PROGRAM

## 2025-09-02 PROCEDURE — 83036 HEMOGLOBIN GLYCOSYLATED A1C: CPT | Mod: QW,S$GLB,, | Performed by: STUDENT IN AN ORGANIZED HEALTH CARE EDUCATION/TRAINING PROGRAM

## 2025-09-02 PROCEDURE — 80053 COMPREHEN METABOLIC PANEL: CPT

## 2025-09-02 PROCEDURE — 4010F ACE/ARB THERAPY RXD/TAKEN: CPT | Mod: CPTII,S$GLB,, | Performed by: STUDENT IN AN ORGANIZED HEALTH CARE EDUCATION/TRAINING PROGRAM

## 2025-09-02 PROCEDURE — 3080F DIAST BP >= 90 MM HG: CPT | Mod: CPTII,S$GLB,, | Performed by: STUDENT IN AN ORGANIZED HEALTH CARE EDUCATION/TRAINING PROGRAM

## 2025-09-02 PROCEDURE — 3044F HG A1C LEVEL LT 7.0%: CPT | Mod: CPTII,S$GLB,, | Performed by: STUDENT IN AN ORGANIZED HEALTH CARE EDUCATION/TRAINING PROGRAM

## 2025-09-02 PROCEDURE — 3077F SYST BP >= 140 MM HG: CPT | Mod: CPTII,S$GLB,, | Performed by: STUDENT IN AN ORGANIZED HEALTH CARE EDUCATION/TRAINING PROGRAM

## 2025-09-02 RX ORDER — AMLODIPINE AND VALSARTAN 10; 160 MG/1; MG/1
1 TABLET ORAL DAILY
Qty: 30 TABLET | Refills: 2 | Status: SHIPPED | OUTPATIENT
Start: 2025-09-02 | End: 2025-12-01

## 2025-09-03 ENCOUNTER — RESULTS FOLLOW-UP (OUTPATIENT)
Dept: FAMILY MEDICINE | Facility: CLINIC | Age: 45
End: 2025-09-03
Payer: COMMERCIAL

## 2025-09-03 DIAGNOSIS — E11.69 HYPERLIPIDEMIA ASSOCIATED WITH TYPE 2 DIABETES MELLITUS: ICD-10-CM

## 2025-09-03 DIAGNOSIS — E11.65 TYPE 2 DIABETES MELLITUS WITH HYPERGLYCEMIA, WITHOUT LONG-TERM CURRENT USE OF INSULIN: Primary | ICD-10-CM

## 2025-09-03 DIAGNOSIS — E78.5 HYPERLIPIDEMIA ASSOCIATED WITH TYPE 2 DIABETES MELLITUS: ICD-10-CM

## 2025-09-03 RX ORDER — ROSUVASTATIN CALCIUM 20 MG/1
20 TABLET, COATED ORAL DAILY
Qty: 90 TABLET | Refills: 3 | Status: SHIPPED | OUTPATIENT
Start: 2025-09-03 | End: 2026-09-03